# Patient Record
Sex: FEMALE | Race: WHITE | NOT HISPANIC OR LATINO | Employment: UNEMPLOYED | URBAN - NONMETROPOLITAN AREA
[De-identification: names, ages, dates, MRNs, and addresses within clinical notes are randomized per-mention and may not be internally consistent; named-entity substitution may affect disease eponyms.]

---

## 2017-01-09 ENCOUNTER — CLINICAL SUPPORT (OUTPATIENT)
Dept: OTOLARYNGOLOGY | Facility: CLINIC | Age: 52
End: 2017-01-09

## 2017-01-09 DIAGNOSIS — J30.9 ALLERGIC RHINITIS, UNSPECIFIED ALLERGIC RHINITIS TRIGGER, UNSPECIFIED RHINITIS SEASONALITY: Primary | ICD-10-CM

## 2017-01-09 PROCEDURE — 95117 IMMUNOTHERAPY INJECTIONS: CPT | Performed by: OTOLARYNGOLOGY

## 2017-01-09 NOTE — PROGRESS NOTES
ALLERGY SHOT PROCEDURE NOTE     ALLERGY TECHNICIAN:   Amy Connors RN     ALLERGY HISTORY OF PRESENT ILLNESS:   The patient is a 51 y.o.  who returns for immunotherapy injection.   The patient overall has had an improvement of symptoms since the last injection. The patient has had a 90% improvement of nasal drainage, nasal congestion and sinus pressure . That has lasted 6 days. He has had a return of nasal drainage, sneezing and itchy nose since the last injection.     INJECTION DETAILS:   The site of the injection was cleansed with an alcohol swab. Serum was injected into the site after pulling back on the plunger to prevent intravascular injection. The patient showed no signs of immediate reaction. After the injection and was instructed to wait in the allergy waiting area for 20 minutes. After waiting, the patient showed no signs of reaction and was allowed to leave..

## 2017-01-09 NOTE — MR AVS SNAPSHOT
Floridalma Stewart   1/9/2017 9:15 AM   Clinical Support    Dept Phone:  [phone]   Encounter #:  [id number]    Provider:  ALLERGY NURSE [facility]   Department:  [facility]                Your Full Care Plan              Your Updated Medication List          This list is accurate as of: 1/9/17 10:12 AM.  Always use your most recent med list.                amitriptyline 100 MG tablet   Commonly known as:  ELAVIL       aspirin 81 MG tablet       atorvastatin 40 MG tablet   Commonly known as:  LIPITOR       baclofen 20 MG tablet   Commonly known as:  LIORESAL       busPIRone 15 MG tablet   Commonly known as:  BUSPAR       cetirizine 10 MG tablet   Commonly known as:  zyrTEC       FLUoxetine 40 MG capsule   Commonly known as:  PROzac       KLOR-CON 10 MEQ CR tablet   Generic drug:  potassium chloride       lisinopril 10 MG tablet   Commonly known as:  PRINIVIL,ZESTRIL       meclizine 25 MG tablet   Commonly known as:  ANTIVERT       montelukast 10 MG tablet   Commonly known as:  SINGULAIR       pseudoephedrine 60 MG tablet   Commonly known as:  SUDAFED       rizatriptan MLT 10 MG disintegrating tablet   Commonly known as:  MAXALT-MLT       zolpidem 10 MG tablet   Commonly known as:  AMBIEN               You Were Diagnosed With        Codes Comments    Allergic rhinitis, unspecified allergic rhinitis trigger, unspecified rhinitis seasonality    -  Primary ICD-10-CM: J30.9  ICD-9-CM: 477.9       Medications to be Given to You by a Medical Professional     Due       Frequency    (none) Allergy Serum Injection  Once    (none) Allergy Serum Injection  Once      Instructions     None    Patient Instructions History      Upcoming Appointments     Visit Type Date Time Department    INJECTION 1/9/2017  9:15 AM [facility]    FOLLOW UP 2/13/2017  9:00 AM [facility] Signup     Our records indicate that you have declined [facility]  "signup. If you would like to sign up for [facility], please email [email or web address] or call [phone] to obtain an activation code.             Other Info from Your Visit           Your Appointments     Feb 13, 2017  9:00 AM CST   Follow Up with Benedict Gupta MD   [facility] (--)    [address] [facility] [id number] [phone]           Arrive 15 minutes prior to appointment.              Allergies     Ciprofloxacin  Swelling    Hydromorphone      agitated    Ibuprofen  Nausea Only    GI Upset    Naproxen  Nausea Only    Nsaids  Nausea And Vomiting    Penicillins  Nausea Only    Pt states \"I black out\"    Tramadol  Nausea And Vomiting      Reason for Visit     Immunotherapy vial safety       Vital Signs     Smoking Status                   Never Assessed           Problems and Diagnoses Noted     Nasal inflammation due to allergen      Medications Administered     Allergy Serum Injection                  Allergy Serum Injection                      "

## 2017-02-13 ENCOUNTER — OFFICE VISIT (OUTPATIENT)
Dept: OTOLARYNGOLOGY | Facility: CLINIC | Age: 52
End: 2017-02-13

## 2017-02-13 VITALS
BODY MASS INDEX: 35 KG/M2 | HEIGHT: 67 IN | SYSTOLIC BLOOD PRESSURE: 127 MMHG | HEART RATE: 103 BPM | DIASTOLIC BLOOD PRESSURE: 86 MMHG | TEMPERATURE: 98.4 F | WEIGHT: 223 LBS

## 2017-02-13 DIAGNOSIS — J30.9 ALLERGIC RHINITIS, UNSPECIFIED ALLERGIC RHINITIS TRIGGER, UNSPECIFIED RHINITIS SEASONALITY: Primary | ICD-10-CM

## 2017-02-13 DIAGNOSIS — H69.83 EUSTACHIAN TUBE DYSFUNCTION, BILATERAL: ICD-10-CM

## 2017-02-13 PROBLEM — H69.80 EUSTACHIAN TUBE DYSFUNCTION: Status: ACTIVE | Noted: 2017-02-13

## 2017-02-13 PROCEDURE — 99214 OFFICE O/P EST MOD 30 MIN: CPT | Performed by: OTOLARYNGOLOGY

## 2017-02-13 PROCEDURE — 69433 CREATE EARDRUM OPENING: CPT | Performed by: OTOLARYNGOLOGY

## 2017-02-13 RX ORDER — FLUTICASONE PROPIONATE 50 MCG
2 SPRAY, SUSPENSION (ML) NASAL DAILY
Qty: 1 EACH | Refills: 11 | Status: SHIPPED | OUTPATIENT
Start: 2017-02-13 | End: 2017-09-11 | Stop reason: SDUPTHER

## 2017-02-13 RX ORDER — MONTELUKAST SODIUM 10 MG/1
10 TABLET ORAL NIGHTLY
Qty: 30 TABLET | Refills: 11 | Status: SHIPPED | OUTPATIENT
Start: 2017-02-13

## 2017-02-13 RX ORDER — FLUTICASONE PROPIONATE 50 MCG
2 SPRAY, SUSPENSION (ML) NASAL DAILY
COMMUNITY
End: 2017-02-13 | Stop reason: SDUPTHER

## 2017-02-13 NOTE — PATIENT INSTRUCTIONS
Steps to Quit Smoking   Smoking tobacco can be harmful to your health and can affect almost every organ in your body. Smoking puts you, and those around you, at risk for developing many serious chronic diseases. Quitting smoking is difficult, but it is one of the best things that you can do for your health. It is never too late to quit.  WHAT ARE THE BENEFITS OF QUITTING SMOKING?  When you quit smoking, you lower your risk of developing serious diseases and conditions, such as:  · Lung cancer or lung disease, such as COPD.  · Heart disease.  · Stroke.  · Heart attack.  · Infertility.  · Osteoporosis and bone fractures.  Additionally, symptoms such as coughing, wheezing, and shortness of breath may get better when you quit. You may also find that you get sick less often because your body is stronger at fighting off colds and infections. If you are pregnant, quitting smoking can help to reduce your chances of having a baby of low birth weight.  HOW DO I GET READY TO QUIT?  When you decide to quit smoking, create a plan to make sure that you are successful. Before you quit:  · Pick a date to quit. Set a date within the next two weeks to give you time to prepare.  · Write down the reasons why you are quitting. Keep this list in places where you will see it often, such as on your bathroom mirror or in your car or wallet.  · Identify the people, places, things, and activities that make you want to smoke (triggers) and avoid them. Make sure to take these actions:    Throw away all cigarettes at home, at work, and in your car.    Throw away smoking accessories, such as ashtrays and lighters.    Clean your car and make sure to empty the ashtray.    Clean your home, including curtains and carpets.  · Tell your family, friends, and coworkers that you are quitting. Support from your loved ones can make quitting easier.  · Talk with your health care provider about your options for quitting smoking.  · Find out what treatment  "options are covered by your health insurance.  WHAT STRATEGIES CAN I USE TO QUIT SMOKING?   Talk with your healthcare provider about different strategies to quit smoking. Some strategies include:  · Quitting smoking altogether instead of gradually lessening how much you smoke over a period of time. Research shows that quitting \"cold turkey\" is more successful than gradually quitting.  · Attending in-person counseling to help you build problem-solving skills. You are more likely to have success in quitting if you attend several counseling sessions. Even short sessions of 10 minutes can be effective.  · Finding resources and support systems that can help you to quit smoking and remain smoke-free after you quit. These resources are most helpful when you use them often. They can include:    Online chats with a counselor.    Telephone quitlines.    Printed self-help materials.    Support groups or group counseling.    Text messaging programs.    Mobile phone applications.  · Taking medicines to help you quit smoking. (If you are pregnant or breastfeeding, talk with your health care provider first.) Some medicines contain nicotine and some do not. Both types of medicines help with cravings, but the medicines that include nicotine help to relieve withdrawal symptoms. Your health care provider may recommend:    Nicotine patches, gum, or lozenges.    Nicotine inhalers or sprays.    Non-nicotine medicine that is taken by mouth.  Talk with your health care provider about combining strategies, such as taking medicines while you are also receiving in-person counseling. Using these two strategies together makes you more likely to succeed in quitting than if you used either strategy on its own.  If you are pregnant or breastfeeding, talk with your health care provider about finding counseling or other support strategies to quit smoking. Do not take medicine to help you quit smoking unless told to do so by your health care " provider.  WHAT THINGS CAN I DO TO MAKE IT EASIER TO QUIT?  Quitting smoking might feel overwhelming at first, but there is a lot that you can do to make it easier. Take these important actions:  · Reach out to your family and friends and ask that they support and encourage you during this time. Call telephone quitlines, reach out to support groups, or work with a counselor for support.  · Ask people who smoke to avoid smoking around you.  · Avoid places that trigger you to smoke, such as bars, parties, or smoke-break areas at work.  · Spend time around people who do not smoke.  · Lessen stress in your life, because stress can be a smoking trigger for some people. To lessen stress, try:    Exercising regularly.    Deep-breathing exercises.    Yoga.    Meditating.    Performing a body scan. This involves closing your eyes, scanning your body from head to toe, and noticing which parts of your body are particularly tense. Purposefully relax the muscles in those areas.  · Download or purchase mobile phone or tablet apps (applications) that can help you stick to your quit plan by providing reminders, tips, and encouragement. There are many free apps, such as QuitGuide from the CDC (Centers for Disease Control and Prevention). You can find other support for quitting smoking (smoking cessation) through smokefree.gov and other websites.  HOW WILL I FEEL WHEN I QUIT SMOKING?  Within the first 24 hours of quitting smoking, you may start to feel some withdrawal symptoms. These symptoms are usually most noticeable 2-3 days after quitting, but they usually do not last beyond 2-3 weeks. Changes or symptoms that you might experience include:  · Mood swings.  · Restlessness, anxiety, or irritation.  · Difficulty concentrating.  · Dizziness.  · Strong cravings for sugary foods in addition to nicotine.  · Mild weight gain.  · Constipation.  · Nausea.  · Coughing or a sore throat.  · Changes in how your medicines work in your  body.  · A depressed mood.  · Difficulty sleeping (insomnia).  After the first 2-3 weeks of quitting, you may start to notice more positive results, such as:  · Improved sense of smell and taste.  · Decreased coughing and sore throat.  · Slower heart rate.  · Lower blood pressure.  · Clearer skin.  · The ability to breathe more easily.  · Fewer sick days.  Quitting smoking is very challenging for most people. Do not get discouraged if you are not successful the first time. Some people need to make many attempts to quit before they achieve long-term success. Do your best to stick to your quit plan, and talk with your health care provider if you have any questions or concerns.     This information is not intended to replace advice given to you by your health care provider. Make sure you discuss any questions you have with your health care provider.     Document Released: 12/12/2002 Document Revised: 05/03/2016 Document Reviewed: 05/03/2016  [employer] Patient Education ©2016 [employer]

## 2017-02-13 NOTE — PROGRESS NOTES
PRIMARY CARE PROVIDER: BLANCHE Timmons  REFERRING PROVIDER: No ref. provider found    Chief Complaint   Patient presents with   • Follow-up     6 month allergy follow up       Subjective   History of Present Illness:  Floridalma Stewart is a  52 y.o.  female who presents for follow up.  She still has mild symptoms but is improved overall since the last evaluation. She hs had left ear drainage recently that is mild in nature.     Review of Systems:  Review of Systems   Constitutional: Negative for chills and fever.   HENT:        See HPI   Eyes: Negative for redness and itching.   Respiratory: Negative for cough, choking and shortness of breath.    Gastrointestinal: Negative for nausea and vomiting.   Allergic/Immunologic: Positive for environmental allergies. Negative for food allergies.   Neurological: Positive for headaches. Negative for dizziness.   Hematological: Negative.    Psychiatric/Behavioral: Negative.        Past History:  Past Medical History   Diagnosis Date   • Allergic rhinitis    • Arthritis    • Chronic rhinitis    • Chronic sinusitis    • Eustachian tube dysfunction    • GERD (gastroesophageal reflux disease)    • Tobacco use disorder    • Vertigo      Past Surgical History   Procedure Laterality Date   • Appendectomy     • Breast biopsy     •  section     • Cholecystectomy     • Myringotomy w/ tubes Bilateral 2015   • Hysterectomy     • Tympanoplasty Left      Family History   Problem Relation Age of Onset   • Diabetes Mother    • Cancer Father      Social History   Substance Use Topics   • Smoking status: Current Every Day Smoker   • Smokeless tobacco: None   • Alcohol use None       Current Outpatient Prescriptions:   •  amitriptyline (ELAVIL) 100 MG tablet, Take 100 mg by mouth., Disp: , Rfl:   •  aspirin 81 MG tablet, Take 81 mg by mouth daily., Disp: , Rfl:   •  atorvastatin (LIPITOR) 40 MG tablet, Take 40 mg by mouth., Disp: , Rfl:   •  baclofen (LIORESAL) 20 MG tablet, Take  20 mg by mouth., Disp: , Rfl:   •  busPIRone (BUSPAR) 15 MG tablet, Take 15 mg by mouth., Disp: , Rfl:   •  cetirizine (ZyrTEC) 10 MG tablet, Take 10 mg by mouth., Disp: , Rfl:   •  FLUoxetine (PROzac) 40 MG capsule, Take 40 mg by mouth., Disp: , Rfl:   •  fluticasone (FLONASE) 50 MCG/ACT nasal spray, 2 sprays into each nostril Daily., Disp: 1 each, Rfl: 11  •  lisinopril (PRINIVIL,ZESTRIL) 10 MG tablet, Take 10 mg by mouth daily., Disp: , Rfl:   •  meclizine (ANTIVERT) 25 MG tablet, Take 25 mg by mouth 3 (three) times a day., Disp: , Rfl:   •  montelukast (SINGULAIR) 10 MG tablet, Take 1 tablet by mouth Every Night., Disp: 30 tablet, Rfl: 11  •  potassium chloride (KLOR-CON) 10 MEQ CR tablet, Take 10 mEq by mouth., Disp: , Rfl:   •  pseudoephedrine (SUDAFED) 60 MG tablet, Take 60 mg by mouth., Disp: , Rfl:   •  rizatriptan MLT (MAXALT-MLT) 10 MG disintegrating tablet, Take 10 mg by mouth., Disp: , Rfl:   •  zolpidem (AMBIEN) 10 MG tablet, Take 10 mg by mouth., Disp: , Rfl:   Allergies:  Ciprofloxacin; Hydromorphone; Ibuprofen; Naproxen; Nsaids; Penicillins; and Tramadol    Objective     Vital Signs:       Physical Exam:  CONSTITUTIONAL: well nourished, well-developed, alert, oriented, in no acute distress   COMMUNICATION AND VOICE: able to communicate normally for age, normal voice quality  HEAD: normocephalic, no lesions, atraumatic, no tenderness, no masses   FACE: appearance normal, no lesions, no tenderness, no deformities, facial motion symmetric  EYES: ocular motility normal, eyelids normal, orbits normal, no proptosis, conjunctiva normal , pupils equal, round   EARS:  Hearing: response to conversational voice normal bilaterally   External Ears: auricles without lesions  Otoscopic Exam:   EXTERNAL CANAL: bilateral extruded tube present,   RIGHT TYMPANIC MEMBRANE: mild  myringosclerosis present,  LEFT TYMPANIC MEMBRANE: mild myringosclerosis present  NOSE:  External Nose: structure normal, no tenderness on  palpation, no nasal discharge, no lesions, no evidence of trauma, nostrils patent   Intranasal exam: nasal mucosa with mucosal congestion and erythema, nasal septum relatively midline   ORAL:  Lips: upper and lower lips without lesion   NECK: neck appearance normal  CHEST/RESPIRATORY: respiratory effort normal, normal chest excursion  CARDIOVASCULAR: extremities without cyanosis or edema   NEUROLOGIC/PSYCHIATRIC: oriented appropriately, mood normal, affect appropriate, CN II-XII intact grossly      Results Review:   none    Assessment   1. Allergic rhinitis, unspecified allergic rhinitis trigger, unspecified rhinitis seasonality    2. Eustachian tube dysfunction, bilateral        Plan   Continue current management plan.  -------MEDICATIONS:-------  continue previous medication as prescribed  New Medications Ordered This Visit   Medications   • fluticasone (FLONASE) 50 MCG/ACT nasal spray     Si sprays into each nostril Daily.     Dispense:  1 each     Refill:  11   • montelukast (SINGULAIR) 10 MG tablet     Sig: Take 1 tablet by mouth Every Night.     Dispense:  30 tablet     Refill:  11     ---INFORMED CONSENT DISCUSSION:---  MYRINGOTOMY TUBE INSERTION: The risks and benefits of myringotomy tube insertion were explained including but not limited to pain, aural fullness, bleeding, infection, risks of the anesthesia, persistent tympanic membrane perforation, chronic otorrhea, early and late extrusion, and the possibility for the need of reinsertion after extrusion. Alternatives were discussed. The patient/parents demonstrated understanding of these risks. Questions were asked appropriately answered.    -----INSTRUCTIONS-----  Protect getting water in the ears. If needed, may use over the counter silicone plugs or a cotton ball coated with vasoline when bathing. If conservative measures are not working, consider obtaining molded earplugs from the audiology department to use while bathing or swimming.    Return in  about 2 months (around 4/13/2017).     MYRINGOTOMY TUBE INSERTION     DATE OF OPERATION:  2/15/2017    PROCEDURE PERFORMED:  bilateral myringotomy and tube insertion.    SURGEON:  Benedict Gupta MD    ANESTHESIA: topical phenol    INDICATIONS:  lForidalma Stewart is a 52 y.o. female with the above diagnosis.  After failure of conservative medical management, myringotomy tube insertion was felt to be indicated.  The risks and benefits of myringotomy tube insertion were explained including but not limited to pain, aural fullness, bleeding, infection, risks of the anesthesia, persistent tympanic membrane perforation, chronic otorrhea, early and late extrusion, and the possibility for the need of reinsertion after extrusion. Alternatives were discussed. An understanding of these risks was demonstrated. Questions were asked appropriately answered.      ESTIMATED BLOOD LOSS:  Negligible.    DRAINS: None.    SPECIMEN:  None.    COMPLICATIONS:  None.    FINDINGS: dry middle ears    PROCEDURE DESCRIPTION:  The patient was taken back to the treatment room and placed supine on the procedure table. A time out was performed. After this was done the operating microscope was wheeled into view. Using the speculum and curette, the external auditory canal was cleaned of its cerumen and this exposed the tympanic membrane. A myringotomy was created in a radial fashion. After suctioning, a Day modified beveled was placed in the myringotomy. The same procedure was then carried out on the opposite side in the same manner. No drops were placed. There were no complications during the case.    Benedict Gupta MD  02/15/17  10:01 AM

## 2017-02-24 ENCOUNTER — CLINICAL SUPPORT NO REQUIREMENTS (OUTPATIENT)
Dept: OTOLARYNGOLOGY | Facility: CLINIC | Age: 52
End: 2017-02-24

## 2017-02-24 DIAGNOSIS — J30.89 OTHER ALLERGIC RHINITIS: ICD-10-CM

## 2017-02-24 DIAGNOSIS — J30.2 OTHER SEASONAL ALLERGIC RHINITIS: Primary | ICD-10-CM

## 2017-02-24 PROCEDURE — 95165 ANTIGEN THERAPY SERVICES: CPT | Performed by: OTOLARYNGOLOGY

## 2017-02-27 ENCOUNTER — CLINICAL SUPPORT (OUTPATIENT)
Dept: OTOLARYNGOLOGY | Facility: CLINIC | Age: 52
End: 2017-02-27

## 2017-02-27 DIAGNOSIS — J30.9 ALLERGIC RHINITIS, UNSPECIFIED ALLERGIC RHINITIS TRIGGER, UNSPECIFIED RHINITIS SEASONALITY: Primary | ICD-10-CM

## 2017-02-27 PROCEDURE — 95117 IMMUNOTHERAPY INJECTIONS: CPT | Performed by: OTOLARYNGOLOGY

## 2017-02-27 NOTE — PROGRESS NOTES
ALLERGY SHOT PROCEDURE NOTE     ALLERGY TECHNICIAN:   Amy Connors RN     ALLERGY HISTORY OF PRESENT ILLNESS:   The patient is a 52 y.o., who returns for immunotherapy injection.   The patient overall has had an improvement of symptoms since the last injection. The patient has had a 90% improvement of nasal drainage, nasal congestion and headaches . That has lasted 7 days. She has had a return of nasal drainage, sneezing and itchy nose since the last injection.     INJECTION DETAILS:   The site of the injection was cleansed with an alcohol swab. Serum was injected into the site after pulling back on the plunger to prevent intravascular injection. The patient showed no signs of immediate reaction. After the injection and was instructed to wait in the allergy waiting area for 20 minutes. After waiting, the patient showed no signs of reaction and was allowed to leave.    Reaction: 3mm

## 2017-03-27 ENCOUNTER — CLINICAL SUPPORT (OUTPATIENT)
Dept: OTOLARYNGOLOGY | Facility: CLINIC | Age: 52
End: 2017-03-27

## 2017-03-27 DIAGNOSIS — J30.9 ALLERGIC RHINITIS, UNSPECIFIED ALLERGIC RHINITIS TRIGGER, UNSPECIFIED RHINITIS SEASONALITY: Primary | ICD-10-CM

## 2017-03-27 PROCEDURE — 95117 IMMUNOTHERAPY INJECTIONS: CPT | Performed by: OTOLARYNGOLOGY

## 2017-03-27 NOTE — PROGRESS NOTES
ALLERGY SHOT PROCEDURE NOTE     ALLERGY TECHNICIAN:   Amy Connors RN    ALLERGY HISTORY OF PRESENT ILLNESS:   The patient is a 52 y.o., who returns for immunotherapy injection.   The patient overall has had an improvement of symptoms since the last injection. The patient has had a 80% improvement of nasal drainage, sneezing and itchy nose . That has lasted 6 days. She has had a return of nasal drainage, sneezing and itchy nose since the last injection.     INJECTION DETAILS:   The site of the injection was cleansed with an alcohol swab. Serum was injected into the site after pulling back on the plunger to prevent intravascular injection. The patient showed no signs of immediate reaction. After the injection and was instructed to wait in the allergy waiting area for 20 minutes. After waiting, the patient showed no signs of reaction and was allowed to leave.    Reaction: 3mm  Side :     left arm

## 2017-04-10 ENCOUNTER — CLINICAL SUPPORT (OUTPATIENT)
Dept: OTOLARYNGOLOGY | Facility: CLINIC | Age: 52
End: 2017-04-10

## 2017-04-10 DIAGNOSIS — J30.9 ALLERGIC RHINITIS, UNSPECIFIED ALLERGIC RHINITIS TRIGGER, UNSPECIFIED RHINITIS SEASONALITY: Primary | ICD-10-CM

## 2017-04-10 PROCEDURE — 95115 IMMUNOTHERAPY ONE INJECTION: CPT | Performed by: OTOLARYNGOLOGY

## 2017-04-10 NOTE — PROGRESS NOTES
ALLERGY SHOT PROCEDURE NOTE     ALLERGY TECHNICIAN:   Amy Connors RN    ALLERGY HISTORY OF PRESENT ILLNESS:   The patient is a 52 y.o., who returns for immunotherapy injection.   The patient overall has had an improvement of symptoms since the last injection. The patient has had a 90% improvement of nasal drainage, nasal congestion and itchy nose . That has lasted 7 days. She has had a return of itchy nose since the last injection.     INJECTION DETAILS:   The site of the injection was cleansed with an alcohol swab. Serum was injected into the site after pulling back on the plunger to prevent intravascular injection. The patient showed no signs of immediate reaction. After the injection and was instructed to wait in the allergy waiting area for 20 minutes. After waiting, the patient showed no signs of reaction and was allowed to leave.    Reaction: 3mm  Side :     right arm

## 2017-04-24 ENCOUNTER — OFFICE VISIT (OUTPATIENT)
Dept: OTOLARYNGOLOGY | Facility: CLINIC | Age: 52
End: 2017-04-24

## 2017-04-24 ENCOUNTER — CLINICAL SUPPORT (OUTPATIENT)
Dept: OTOLARYNGOLOGY | Facility: CLINIC | Age: 52
End: 2017-04-24

## 2017-04-24 VITALS
SYSTOLIC BLOOD PRESSURE: 109 MMHG | HEART RATE: 109 BPM | DIASTOLIC BLOOD PRESSURE: 80 MMHG | TEMPERATURE: 98.6 F | HEIGHT: 67 IN

## 2017-04-24 DIAGNOSIS — J30.9 ALLERGIC RHINITIS, UNSPECIFIED ALLERGIC RHINITIS TRIGGER, UNSPECIFIED RHINITIS SEASONALITY: Primary | ICD-10-CM

## 2017-04-24 DIAGNOSIS — H69.83 EUSTACHIAN TUBE DYSFUNCTION, BILATERAL: ICD-10-CM

## 2017-04-24 PROCEDURE — 99213 OFFICE O/P EST LOW 20 MIN: CPT | Performed by: OTOLARYNGOLOGY

## 2017-04-24 PROCEDURE — 95115 IMMUNOTHERAPY ONE INJECTION: CPT | Performed by: OTOLARYNGOLOGY

## 2017-04-24 NOTE — PROGRESS NOTES
Patient Care Team:  BLANCHE Timmons as PCP - General (Family Medicine)  Benedict Gupta MD as Consulting Physician (Otolaryngology)    Chief Complaint   Patient presents with   • Follow-up     2 month allergy follow up       Subjective   History of Present Illness:  Floridalma Stewart is a  52 y.o.  female who presents for follow up with no acute complaints. She is status post tube placement and is n every other week immunotherapy.    Review of Systems:  Review of Systems   Constitutional: Negative.    HENT:        See HPI   Eyes: Negative.    Respiratory: Negative.    Cardiovascular: Negative.    Gastrointestinal: Negative.    Allergic/Immunologic: Positive for environmental allergies. Negative for food allergies.   Neurological: Negative.    Hematological: Negative.    Psychiatric/Behavioral: Negative.        Past History:  Past Medical History:   Diagnosis Date   • Allergic rhinitis    • Arthritis    • Chronic rhinitis    • Chronic sinusitis    • Eustachian tube dysfunction    • GERD (gastroesophageal reflux disease)    • Tobacco use disorder    • Vertigo      Past Surgical History:   Procedure Laterality Date   • APPENDECTOMY     • BREAST BIOPSY     •  SECTION     • CHOLECYSTECTOMY     • HYSTERECTOMY     • MYRINGOTOMY W/ TUBES Bilateral 2015   • TYMPANOPLASTY Left      Family History   Problem Relation Age of Onset   • Diabetes Mother    • Cancer Father      Social History   Substance Use Topics   • Smoking status: Current Every Day Smoker   • Smokeless tobacco: None   • Alcohol use None       Current Outpatient Prescriptions:   •  amitriptyline (ELAVIL) 100 MG tablet, Take 100 mg by mouth., Disp: , Rfl:   •  aspirin 81 MG tablet, Take 81 mg by mouth daily., Disp: , Rfl:   •  atorvastatin (LIPITOR) 40 MG tablet, Take 40 mg by mouth., Disp: , Rfl:   •  baclofen (LIORESAL) 20 MG tablet, Take 20 mg by mouth., Disp: , Rfl:   •  busPIRone (BUSPAR) 15 MG tablet, Take 15 mg by mouth., Disp: , Rfl:    •  cetirizine (ZyrTEC) 10 MG tablet, Take 10 mg by mouth., Disp: , Rfl:   •  FLUoxetine (PROzac) 40 MG capsule, Take 40 mg by mouth., Disp: , Rfl:   •  fluticasone (FLONASE) 50 MCG/ACT nasal spray, 2 sprays into each nostril Daily., Disp: 1 each, Rfl: 11  •  lisinopril (PRINIVIL,ZESTRIL) 10 MG tablet, Take 10 mg by mouth daily., Disp: , Rfl:   •  meclizine (ANTIVERT) 25 MG tablet, Take 25 mg by mouth 3 (three) times a day., Disp: , Rfl:   •  montelukast (SINGULAIR) 10 MG tablet, Take 1 tablet by mouth Every Night., Disp: 30 tablet, Rfl: 11  •  potassium chloride (KLOR-CON) 10 MEQ CR tablet, Take 10 mEq by mouth., Disp: , Rfl:   •  pseudoephedrine (SUDAFED) 60 MG tablet, Take 60 mg by mouth., Disp: , Rfl:   •  rizatriptan MLT (MAXALT-MLT) 10 MG disintegrating tablet, Take 10 mg by mouth., Disp: , Rfl:   •  zolpidem (AMBIEN) 10 MG tablet, Take 10 mg by mouth., Disp: , Rfl:   No current facility-administered medications for this visit.   Allergies:  Ciprofloxacin; Hydromorphone; Ibuprofen; Naproxen; Nsaids; Penicillins; and Tramadol    Objective     Vital Signs:  Temp:  [98.6 °F (37 °C)] 98.6 °F (37 °C)  Heart Rate:  [109] 109  BP: (109)/(80) 109/80    Physical Exam:  CONSTITUTIONAL: well nourished, well-developed, alert, oriented, in no acute distress   COMMUNICATION AND VOICE: able to communicate normally, normal voice quality  HEAD: normocephalic, no lesions, atraumatic, no tenderness, no masses   FACE: appearance normal, no lesions, no tenderness, no deformities, facial motion symmetric  EYES: ocular motility normal, eyelids normal, orbits normal, no proptosis, conjunctiva normal , pupils equal, round   EARS:  Hearing: hearing to conversational voice intact bilaterally   External Ears: normal bilaterally, no lesions  Otoscopic: tympanic membrane appearance normal, no lesions, tubes in place and dry, normal mobility, no fluid  NOSE:  External Nose: external nasal structure normal, no tenderness on palpation, no  nasal discharge, no lesions, no evidence of trauma, nostrils patent   ORAL:  Lips: upper and lower lips without lesion   NECK:  Inspection and Palpation: neck appearance normal, no masses or tenderness  CHEST/RESPIRATORY: normal respiratory effort   CARDIOVASCULAR: no cyanosis or edema   NEUROLOGICAL/PSYCHIATRIC: oriented to time, place and person, mood normal, affect appropriate, CN II-XII intact grossly      Assessment   1. Allergic rhinitis, unspecified allergic rhinitis trigger, unspecified rhinitis seasonality    2. Eustachian tube dysfunction, bilateral        Plan   Continue current management plan.  Dry ear precautions     Return in about 6 months (around 10/24/2017).    Benedict Gupta MD  04/24/17  1:53 PM

## 2017-04-24 NOTE — PROGRESS NOTES
ALLERGY SHOT PROCEDURE NOTE     ALLERGY TECHNICIAN:   Amy Connors RN    ALLERGY HISTORY OF PRESENT ILLNESS:   The patient is a 52 y.o., who returns for immunotherapy injection.   The patient overall has had an improvement of symptoms since the last injection. The patient has had a 90% improvement of postnasal drip, sneezing and itchy nose . That has lasted 7 days. She has had a return of postnasal drip, sneezing and itchy nose since the last injection.     INJECTION DETAILS:   The site of the injection was cleansed with an alcohol swab. Serum was injected into the site after pulling back on the plunger to prevent intravascular injection. The patient showed no signs of immediate reaction. After the injection and was instructed to wait in the allergy waiting area for 20 minutes. After waiting, the patient showed no signs of reaction and was allowed to leave.    Reaction: 4mm  Side :     left arm

## 2017-05-08 ENCOUNTER — CLINICAL SUPPORT (OUTPATIENT)
Dept: OTOLARYNGOLOGY | Facility: CLINIC | Age: 52
End: 2017-05-08

## 2017-05-08 DIAGNOSIS — J30.9 ALLERGIC RHINITIS, UNSPECIFIED ALLERGIC RHINITIS TRIGGER, UNSPECIFIED RHINITIS SEASONALITY: Primary | ICD-10-CM

## 2017-05-08 PROCEDURE — 95115 IMMUNOTHERAPY ONE INJECTION: CPT | Performed by: OTOLARYNGOLOGY

## 2017-05-22 ENCOUNTER — CLINICAL SUPPORT (OUTPATIENT)
Dept: OTOLARYNGOLOGY | Facility: CLINIC | Age: 52
End: 2017-05-22

## 2017-05-22 DIAGNOSIS — J30.9 ALLERGIC RHINITIS, UNSPECIFIED ALLERGIC RHINITIS TRIGGER, UNSPECIFIED RHINITIS SEASONALITY: Primary | ICD-10-CM

## 2017-05-22 PROCEDURE — 95115 IMMUNOTHERAPY ONE INJECTION: CPT | Performed by: OTOLARYNGOLOGY

## 2017-06-12 ENCOUNTER — CLINICAL SUPPORT (OUTPATIENT)
Dept: OTOLARYNGOLOGY | Facility: CLINIC | Age: 52
End: 2017-06-12

## 2017-06-12 DIAGNOSIS — J30.89 OTHER ALLERGIC RHINITIS: Primary | ICD-10-CM

## 2017-06-12 PROCEDURE — 95115 IMMUNOTHERAPY ONE INJECTION: CPT | Performed by: OTOLARYNGOLOGY

## 2017-06-13 NOTE — PROGRESS NOTES
ALLERGY SHOT PROCEDURE NOTE     ALLERGY TECHNICIAN:   Sri Rudolph MA    ALLERGY HISTORY OF PRESENT ILLNESS:   The patient is a 52 y.o., who returns for immunotherapy injection.   The patient overall has had an improvement of symptoms since the last injection. The patient has had a 90% improvement of sneezing and itchy nose . That has lasted 7 days. She has had a return of sneezing and itchy nose since the last injection.     INJECTION DETAILS:   The site of the injection was cleansed with an alcohol swab. Serum was injected into the site after pulling back on the plunger to prevent intravascular injection. The patient showed no signs of immediate reaction. After the injection and was instructed to wait in the allergy waiting area for 20 minutes. After waiting, the patient showed no signs of reaction and was allowed to leave.    Right Arm @ .50cc

## 2017-06-26 ENCOUNTER — CLINICAL SUPPORT (OUTPATIENT)
Dept: OTOLARYNGOLOGY | Facility: CLINIC | Age: 52
End: 2017-06-26

## 2017-06-26 DIAGNOSIS — J30.9 ALLERGIC RHINITIS, UNSPECIFIED ALLERGIC RHINITIS TRIGGER, UNSPECIFIED RHINITIS SEASONALITY: Primary | ICD-10-CM

## 2017-06-26 PROCEDURE — 95115 IMMUNOTHERAPY ONE INJECTION: CPT | Performed by: OTOLARYNGOLOGY

## 2017-06-26 NOTE — PROGRESS NOTES
ALLERGY SHOT PROCEDURE NOTE     ALLERGY TECHNICIAN:   Amy Connors RN    ALLERGY HISTORY OF PRESENT ILLNESS:   The patient is a 52 y.o., who returns for immunotherapy injection.   The patient overall has had an improvement of symptoms since the last injection. The patient has had a 90% improvement of nasal drainage, postnasal drip, sneezing and itchy nose . That has lasted 6 days. She has had a return of postnasal drip, sneezing and itchy nose since the last injection.     INJECTION DETAILS:   The site of the injection was cleansed with an alcohol swab. Serum was injected into the site after pulling back on the plunger to prevent intravascular injection. The patient showed no signs of immediate reaction. After the injection and was instructed to wait in the allergy waiting area for 20 minutes. After waiting, the patient showed no signs of reaction and was allowed to leave.    Reaction: 4mm  Side :     left arm

## 2017-06-30 ENCOUNTER — CLINICAL SUPPORT NO REQUIREMENTS (OUTPATIENT)
Dept: OTOLARYNGOLOGY | Facility: CLINIC | Age: 52
End: 2017-06-30

## 2017-06-30 DIAGNOSIS — J30.89 OTHER ALLERGIC RHINITIS: Primary | ICD-10-CM

## 2017-06-30 PROCEDURE — 95165 ANTIGEN THERAPY SERVICES: CPT | Performed by: OTOLARYNGOLOGY

## 2017-07-10 ENCOUNTER — CLINICAL SUPPORT (OUTPATIENT)
Dept: OTOLARYNGOLOGY | Facility: CLINIC | Age: 52
End: 2017-07-10

## 2017-07-10 DIAGNOSIS — J30.9 ALLERGIC RHINITIS, UNSPECIFIED ALLERGIC RHINITIS TRIGGER, UNSPECIFIED RHINITIS SEASONALITY: Primary | ICD-10-CM

## 2017-07-10 PROCEDURE — 95115 IMMUNOTHERAPY ONE INJECTION: CPT | Performed by: OTOLARYNGOLOGY

## 2017-07-10 NOTE — PROGRESS NOTES
ALLERGY SHOT PROCEDURE NOTE     ALLERGY TECHNICIAN:   Amy Connors RN    ALLERGY HISTORY OF PRESENT ILLNESS:   The patient is a 52 y.o., who returns for immunotherapy injection.   The patient overall has had an improvement of symptoms since the last injection. The patient has had a 90% improvement of nasal drainage, nasal congestion, sneezing and itchy nose . That has lasted 6 days. She has had a return of sneezing and itchy nose since the last injection.     INJECTION DETAILS:   The site of the injection was cleansed with an alcohol swab. Serum was injected into the site after pulling back on the plunger to prevent intravascular injection. The patient showed no signs of immediate reaction. After the injection and was instructed to wait in the allergy waiting area for 20 minutes. After waiting, the patient showed no signs of reaction and was allowed to leave.    Reaction: 3mm  Side :     right arm

## 2017-08-14 ENCOUNTER — CLINICAL SUPPORT (OUTPATIENT)
Dept: OTOLARYNGOLOGY | Facility: CLINIC | Age: 52
End: 2017-08-14

## 2017-08-14 DIAGNOSIS — J30.9 ALLERGIC RHINITIS, UNSPECIFIED ALLERGIC RHINITIS TRIGGER, UNSPECIFIED RHINITIS SEASONALITY: Primary | ICD-10-CM

## 2017-08-14 PROCEDURE — 95115 IMMUNOTHERAPY ONE INJECTION: CPT | Performed by: OTOLARYNGOLOGY

## 2017-08-14 NOTE — PROGRESS NOTES
ALLERGY SHOT PROCEDURE NOTE     ALLERGY TECHNICIAN:   Amy Connors RN    ALLERGY HISTORY OF PRESENT ILLNESS:   The patient is a 52 y.o., who returns for immunotherapy injection.   The patient overall has had an improvement of symptoms since the last injection. The patient has had a 90% improvement of nasal drainage, nasal congestion and epistaxis . That has lasted 6 days. She has had a return of nasal drainage, nasal congestion, sinus pressure, sneezing and itchy nose since the last injection.     INJECTION DETAILS:   The site of the injection was cleansed with an alcohol swab. Serum was injected into the site after pulling back on the plunger to prevent intravascular injection. The patient showed no signs of immediate reaction. After the injection and was instructed to wait in the allergy waiting area for 20 minutes. After waiting, the patient showed no signs of reaction and was allowed to leave.      Continuous maintenance series  Reaction: 3mm  Side :     left arm

## 2017-08-28 ENCOUNTER — CLINICAL SUPPORT (OUTPATIENT)
Dept: OTOLARYNGOLOGY | Facility: CLINIC | Age: 52
End: 2017-08-28

## 2017-08-28 DIAGNOSIS — J30.9 ALLERGIC RHINITIS, UNSPECIFIED ALLERGIC RHINITIS TRIGGER, UNSPECIFIED RHINITIS SEASONALITY: Primary | ICD-10-CM

## 2017-08-28 PROCEDURE — 95117 IMMUNOTHERAPY INJECTIONS: CPT | Performed by: OTOLARYNGOLOGY

## 2017-08-28 NOTE — PROGRESS NOTES
ALLERGY SHOT PROCEDURE NOTE     ALLERGY TECHNICIAN:   Amy Connors RN    ALLERGY HISTORY OF PRESENT ILLNESS:   The patient is a 52 y.o., who returns for immunotherapy injection.   The patient overall has had an improvement of symptoms since the last injection. The patient has had a 90% improvement of nasal drainage, nasal congestion, sneezing and itchy nose . That has lasted 6 days. She has had a return of nasal drainage, nasal congestion, sneezing and itchy nose since the last injection.     INJECTION DETAILS:   The site of the injection was cleansed with an alcohol swab. Serum was injected into the site after pulling back on the plunger to prevent intravascular injection. The patient showed no signs of immediate reaction. After the injection and was instructed to wait in the allergy waiting area for 20 minutes. After waiting, the patient showed no signs of reaction and was allowed to leave.      Maintenance series vial safety test  Reaction: 3mm  Side :     right arm    Reaction: 4mm  Side :     Left arm

## 2017-09-11 ENCOUNTER — CLINICAL SUPPORT (OUTPATIENT)
Dept: OTOLARYNGOLOGY | Facility: CLINIC | Age: 52
End: 2017-09-11

## 2017-09-11 ENCOUNTER — OFFICE VISIT (OUTPATIENT)
Dept: OTOLARYNGOLOGY | Facility: CLINIC | Age: 52
End: 2017-09-11

## 2017-09-11 VITALS
HEART RATE: 84 BPM | HEIGHT: 67 IN | DIASTOLIC BLOOD PRESSURE: 94 MMHG | SYSTOLIC BLOOD PRESSURE: 131 MMHG | BODY MASS INDEX: 35.47 KG/M2 | TEMPERATURE: 97.5 F | WEIGHT: 226 LBS

## 2017-09-11 DIAGNOSIS — H69.83 EUSTACHIAN TUBE DYSFUNCTION, BILATERAL: ICD-10-CM

## 2017-09-11 DIAGNOSIS — J30.9 ALLERGIC RHINITIS, UNSPECIFIED ALLERGIC RHINITIS TRIGGER, UNSPECIFIED RHINITIS SEASONALITY: Primary | ICD-10-CM

## 2017-09-11 DIAGNOSIS — J06.9 VIRAL URI: ICD-10-CM

## 2017-09-11 PROCEDURE — 99214 OFFICE O/P EST MOD 30 MIN: CPT | Performed by: OTOLARYNGOLOGY

## 2017-09-11 PROCEDURE — 95115 IMMUNOTHERAPY ONE INJECTION: CPT | Performed by: OTOLARYNGOLOGY

## 2017-09-11 RX ORDER — FLUTICASONE PROPIONATE 50 MCG
2 SPRAY, SUSPENSION (ML) NASAL DAILY
Qty: 1 BOTTLE | Refills: 3 | Status: SHIPPED | OUTPATIENT
Start: 2017-09-11 | End: 2018-01-02 | Stop reason: SDUPTHER

## 2017-09-11 RX ORDER — CETIRIZINE HYDROCHLORIDE 10 MG/1
10 TABLET ORAL DAILY
Qty: 30 TABLET | Refills: 3 | Status: SHIPPED | OUTPATIENT
Start: 2017-09-11

## 2017-09-11 NOTE — PROGRESS NOTES
Patient Care Team:  BLANCHE Timmons as PCP - General (Family Medicine)  Benedict Gupta MD as Consulting Physician (Otolaryngology)    Chief Complaint   Patient presents with   • Sinus Problem     sinus congestion, cough   • Ear Problem     feels like busted right eardrum       Subjective   HPI   Floridalma Stewart is a  52 y.o. female who is here for follow up. She has had a 3 day history of increased nasal congestion, postnasal drip, and cough.  A few weeks ago, she reports she was swimming and had water that went into the right ear.  She had self limited drainage at that time.  She currently reports no ear complaints.  She has been on immunotherapy for several years and is currently tolerating every other week injections.    Review of Systems:  Reviewed per patient intake note    Past History:  Past Medical History:   Diagnosis Date   • Allergic rhinitis    • Arthritis    • Chronic rhinitis    • Chronic sinusitis    • Eustachian tube dysfunction    • GERD (gastroesophageal reflux disease)    • Tobacco use disorder    • Vertigo      Past Surgical History:   Procedure Laterality Date   • APPENDECTOMY     • BREAST BIOPSY     •  SECTION     • CHOLECYSTECTOMY     • HYSTERECTOMY     • MYRINGOTOMY W/ TUBES Bilateral 2015   • TYMPANOPLASTY Left      Family History   Problem Relation Age of Onset   • Diabetes Mother    • Cancer Father      Social History   Substance Use Topics   • Smoking status: Current Every Day Smoker   • Smokeless tobacco: Never Used   • Alcohol use No       Current Outpatient Prescriptions:   •  amitriptyline (ELAVIL) 100 MG tablet, Take 100 mg by mouth., Disp: , Rfl:   •  aspirin 81 MG tablet, Take 81 mg by mouth daily., Disp: , Rfl:   •  atorvastatin (LIPITOR) 40 MG tablet, Take 40 mg by mouth., Disp: , Rfl:   •  baclofen (LIORESAL) 20 MG tablet, Take 20 mg by mouth., Disp: , Rfl:   •  busPIRone (BUSPAR) 15 MG tablet, Take 15 mg by mouth., Disp: , Rfl:   •  cetirizine (zyrTEC) 10  MG tablet, Take 1 tablet by mouth Daily., Disp: 30 tablet, Rfl: 3  •  FLUoxetine (PROzac) 40 MG capsule, Take 40 mg by mouth., Disp: , Rfl:   •  fluticasone (FLONASE) 50 MCG/ACT nasal spray, 2 sprays into each nostril Daily., Disp: 1 bottle, Rfl: 3  •  lisinopril (PRINIVIL,ZESTRIL) 10 MG tablet, Take 10 mg by mouth daily., Disp: , Rfl:   •  meclizine (ANTIVERT) 25 MG tablet, Take 25 mg by mouth 3 (three) times a day., Disp: , Rfl:   •  montelukast (SINGULAIR) 10 MG tablet, Take 1 tablet by mouth Every Night., Disp: 30 tablet, Rfl: 11  •  potassium chloride (KLOR-CON) 10 MEQ CR tablet, Take 10 mEq by mouth., Disp: , Rfl:   •  pseudoephedrine (SUDAFED) 60 MG tablet, Take 60 mg by mouth., Disp: , Rfl:   •  rizatriptan MLT (MAXALT-MLT) 10 MG disintegrating tablet, Take 10 mg by mouth., Disp: , Rfl:   •  zolpidem (AMBIEN) 10 MG tablet, Take 10 mg by mouth., Disp: , Rfl:   No current facility-administered medications for this visit.   Allergies:  Ciprofloxacin; Hydromorphone; Ibuprofen; Naproxen; Nsaids; Penicillins; and Tramadol    Objective     Vital Signs:  Temp:  [97.5 °F (36.4 °C)] 97.5 °F (36.4 °C)  Heart Rate:  [84] 84  BP: (131)/(94) 131/94    Physical Exam  CONSTITUTIONAL: well nourished, well-developed, alert, oriented, in no acute distress   COMMUNICATION AND VOICE: able to communicate normally for age, normal voice quality  HEAD: normocephalic, no lesions, atraumatic, no tenderness, no masses   FACE: appearance normal, no lesions, no tenderness, no deformities, facial motion symmetric  EYES: ocular motility normal, eyelids normal, orbits normal, no proptosis, conjunctiva normal , pupils equal, round   EARS:  Hearing: response to conversational voice normal bilaterally   External Ears: auricles without lesions  RIGHT EXTERNAL EAR CANAL: normal ear canals without stenosis or significant cerumen  LEFT EXTERNAL EAR CANAL: normal structure, no stenosis, no lesions, no drainage present, extruded tube present,  removed with handheld otoscope,  RIGHT TYMPANIC MEMBRANE: myringotomy tube in place, dry and patent  LEFT TYMPANIC MEMBRANE: moderate dullness present, myringosclerosis present,  NOSE:  External Nose: structure normal, no tenderness on palpation, no nasal discharge, no lesions, no evidence of trauma, nostrils patent   Intranasal exam: nasal mucosa with mucosal congestion and erythema, nasal septum relatively midline   ORAL:  Lips: upper and lower lips without lesion   NECK: neck appearance normal  CHEST/RESPIRATORY: respiratory effort normal, normal chest excursion  CARDIOVASCULAR: extremities without cyanosis or edema   NEUROLOGIC/PSYCHIATRIC: oriented appropriately for age, affect appropriate, CN II-XII intact grossly          Assessment   1. Allergic rhinitis, unspecified allergic rhinitis trigger, unspecified rhinitis seasonality    2. Eustachian tube dysfunction, bilateral    3. Viral URI        Plan   Continue current management plan. We will observe her extruded ear tube conservatively.  If she has symptoms on the left side, she may need repeat myringotomy tube insertion.  We will watch her viral URI.  If this progresses into a bacterial infection, we will consider antibiotic therapy.    -------MEDICATIONS:-------  New Medications Ordered This Visit   Medications   • fluticasone (FLONASE) 50 MCG/ACT nasal spray     Si sprays into each nostril Daily.     Dispense:  1 bottle     Refill:  3   • cetirizine (zyrTEC) 10 MG tablet     Sig: Take 1 tablet by mouth Daily.     Dispense:  30 tablet     Refill:  3        -----INSTRUCTIONS-----  Protect getting water in the ears. If needed, may use over the counter silicone plugs or a cotton ball coated with vasoline when bathing. If conservative measures are not working, consider obtaining molded earplugs from the audiology department to use while bathing or swimming.  For the best response, use your nasal sprays every day without skipping doses. It may take several  weeks before the full effect is acheived.   continue immunotherapy    Return in about 6 months (around 3/11/2018).    Benedict Gupta MD  09/11/17  11:17 AM

## 2017-09-11 NOTE — PROGRESS NOTES
ALLERGY SHOT PROCEDURE NOTE     ALLERGY TECHNICIAN:   Amy Connors RN    ALLERGY HISTORY OF PRESENT ILLNESS:   The patient is a 52 y.o., who returns for immunotherapy injection.   The patient overall has had an improvement of symptoms since the last injection. The patient has had a 90% improvement of nasal drainage, nasal congestion, sinus pressure, sneezing and itchy nose . That has lasted 6 days. She has had a return of nasal drainage, sinus pressure, sneezing and itchy nose since the last injection.     INJECTION DETAILS:   The site of the injection was cleansed with an alcohol swab. Serum was injected into the site after pulling back on the plunger to prevent intravascular injection. The patient showed no signs of immediate reaction. After the injection and was instructed to wait in the allergy waiting area for 20 minutes. After waiting, the patient showed no signs of reaction and was allowed to leave.    Reaction: 4mm  Side :     right arm

## 2017-09-11 NOTE — PROGRESS NOTES
Patient Intake Note    Review of Systems  Review of Systems   Constitutional: Negative for chills, fatigue and fever.   HENT:        See HPI     Respiratory: Positive for cough. Negative for choking, shortness of breath and wheezing.    Cardiovascular: Negative.    Gastrointestinal: Negative for constipation, diarrhea, nausea and vomiting.   Allergic/Immunologic: Negative for environmental allergies and food allergies.   Neurological: Negative for dizziness and headaches.   Hematological: Does not bruise/bleed easily.   Psychiatric/Behavioral: Negative for sleep disturbance.         Spring Griffin  9/11/2017  10:36 AM

## 2017-09-25 ENCOUNTER — CLINICAL SUPPORT (OUTPATIENT)
Dept: OTOLARYNGOLOGY | Facility: CLINIC | Age: 52
End: 2017-09-25

## 2017-09-25 DIAGNOSIS — J30.9 ALLERGIC RHINITIS, UNSPECIFIED ALLERGIC RHINITIS TRIGGER, UNSPECIFIED RHINITIS SEASONALITY: Primary | ICD-10-CM

## 2017-09-25 PROCEDURE — 95115 IMMUNOTHERAPY ONE INJECTION: CPT | Performed by: OTOLARYNGOLOGY

## 2017-09-25 NOTE — PROGRESS NOTES
ALLERGY SHOT PROCEDURE NOTE     ALLERGY TECHNICIAN:   Amy Connors RN    ALLERGY HISTORY OF PRESENT ILLNESS:   The patient is a 52 y.o., who returns for immunotherapy injection.   The patient overall has had an improvement of symptoms since the last injection. The patient has had a 90% improvement of nasal drainage, sneezing and itchy nose . That has lasted 7 days. She has had a return of sneezing and itchy nose since the last injection.     INJECTION DETAILS:   The site of the injection was cleansed with an alcohol swab. Serum was injected into the site after pulling back on the plunger to prevent intravascular injection. The patient showed no signs of immediate reaction. After the injection and was instructed to wait in the allergy waiting area for 20 minutes. After waiting, the patient showed no signs of reaction and was allowed to leave.    Reaction: 3mm  Side :     right arm

## 2017-10-23 ENCOUNTER — CLINICAL SUPPORT (OUTPATIENT)
Dept: OTOLARYNGOLOGY | Facility: CLINIC | Age: 52
End: 2017-10-23

## 2017-10-23 DIAGNOSIS — J30.89 OTHER ALLERGIC RHINITIS: Primary | ICD-10-CM

## 2017-10-23 PROCEDURE — 95115 IMMUNOTHERAPY ONE INJECTION: CPT | Performed by: OTOLARYNGOLOGY

## 2017-10-24 NOTE — PROGRESS NOTES
ALLERGY SHOT PROCEDURE NOTE     ALLERGY TECHNICIAN:   Sri Rudolph MA    ALLERGY HISTORY OF PRESENT ILLNESS:   The patient is a 52 y.o., who returns for immunotherapy injection.   The patient overall has had an improvement of symptoms since the last injection. The patient has had a 90% improvement of nasal drainage, sneezing and itchy nose . That has lasted 7 days. She has had a return of sneezing and itchy nose since the last injection.     INJECTION DETAILS:   The site of the injection was cleansed with an alcohol swab. Serum was injected into the site after pulling back on the plunger to prevent intravascular injection. The patient showed no signs of immediate reaction. After the injection and was instructed to wait in the allergy waiting area for 20 minutes. After waiting, the patient showed no signs of reaction and was allowed to leave.    Combined Maintenance Series Serum   Right Arm @ .50cc

## 2018-01-02 RX ORDER — FLUTICASONE PROPIONATE 50 MCG
SPRAY, SUSPENSION (ML) NASAL
Qty: 16 G | Refills: 3 | Status: SHIPPED | OUTPATIENT
Start: 2018-01-02

## 2020-03-04 ENCOUNTER — OFFICE VISIT (OUTPATIENT)
Dept: OTOLARYNGOLOGY | Facility: CLINIC | Age: 55
End: 2020-03-04

## 2020-03-04 VITALS
TEMPERATURE: 98 F | HEIGHT: 67 IN | HEART RATE: 84 BPM | SYSTOLIC BLOOD PRESSURE: 143 MMHG | WEIGHT: 220 LBS | DIASTOLIC BLOOD PRESSURE: 79 MMHG | RESPIRATION RATE: 20 BRPM | BODY MASS INDEX: 34.53 KG/M2

## 2020-03-04 DIAGNOSIS — J30.9 ALLERGIC RHINITIS, UNSPECIFIED SEASONALITY, UNSPECIFIED TRIGGER: ICD-10-CM

## 2020-03-04 DIAGNOSIS — H69.83 DYSFUNCTION OF BOTH EUSTACHIAN TUBES: Primary | ICD-10-CM

## 2020-03-04 DIAGNOSIS — H66.93 CHRONIC OTITIS MEDIA OF BOTH EARS: ICD-10-CM

## 2020-03-04 DIAGNOSIS — Z72.0 TOBACCO ABUSE DISORDER: ICD-10-CM

## 2020-03-04 PROCEDURE — 99214 OFFICE O/P EST MOD 30 MIN: CPT | Performed by: NURSE PRACTITIONER

## 2020-03-04 PROCEDURE — 92567 TYMPANOMETRY: CPT | Performed by: NURSE PRACTITIONER

## 2020-03-04 RX ORDER — NYSTATIN 100000 [USP'U]/G
POWDER TOPICAL 4 TIMES DAILY
COMMUNITY
End: 2022-07-14

## 2020-03-04 RX ORDER — SPIRONOLACTONE 25 MG/1
25 TABLET ORAL DAILY
COMMUNITY
End: 2022-07-14

## 2020-03-04 RX ORDER — ALBUTEROL SULFATE 90 UG/1
AEROSOL, METERED RESPIRATORY (INHALATION)
COMMUNITY
Start: 2020-02-26 | End: 2022-07-14

## 2020-03-04 RX ORDER — TIOTROPIUM BROMIDE INHALATION SPRAY 1.56 UG/1
SPRAY, METERED RESPIRATORY (INHALATION)
COMMUNITY
Start: 2020-02-26

## 2020-03-04 RX ORDER — ALBUTEROL SULFATE 2.5 MG/3ML
SOLUTION RESPIRATORY (INHALATION)
COMMUNITY
Start: 2020-02-28

## 2020-03-04 RX ORDER — SPIRONOLACTONE 25 MG/1
TABLET ORAL
COMMUNITY
Start: 2019-12-28 | End: 2022-07-14

## 2020-03-04 RX ORDER — ENALAPRIL MALEATE 2.5 MG/1
2.5 TABLET ORAL DAILY
COMMUNITY
Start: 2019-09-05

## 2020-03-04 RX ORDER — OXYCODONE HYDROCHLORIDE 10 MG/1
10 TABLET ORAL
COMMUNITY
End: 2022-07-14

## 2020-03-04 RX ORDER — AMILORIDE HYDROCHLORIDE 5 MG/1
TABLET ORAL
COMMUNITY
Start: 2019-09-05

## 2020-03-04 NOTE — PROGRESS NOTES
PRIMARY CARE PROVIDER: Alice Bashir APRN  REFERRING PROVIDER: No ref. provider found    Chief Complaint   Patient presents with   • Follow-up     Ear fullness       Subjective   History of Present Illness:  Floridalma Stewart is a  55 y.o. female  who presents for follow up s/p bilateral myringotomy tube insertion on 2017. The patient has had a relatively normal postoperative course. The patient has had complaints of otalgia, ear fullness, ear pressure, chronic fluid on the ear and muffled hearing. The symptoms are localized to both ears. The symptoms severity was described as: moderate The symptoms have been: present for the last 4 months There have been no identified factors that aggravate the symptoms. The symptoms are improved by myringotomy tube insertion. .  She has been taking Flonase and Zyrtec daily without improvement in symptoms.  She has a previous history of immunotherapy and BMT x 2 by Dr. Gupta.    Review of Systems:  Review of Systems   Constitutional: Negative for chills and fever.   HENT: Positive for congestion, ear pain, hearing loss and rhinorrhea. Negative for ear discharge, sore throat and voice change.    Respiratory: Negative for cough and shortness of breath.    Cardiovascular: Negative for chest pain.   Gastrointestinal: Negative for diarrhea, nausea and vomiting.   Musculoskeletal: Positive for arthralgias.   Allergic/Immunologic: Positive for environmental allergies.       Past History:  Past Medical History:   Diagnosis Date   • Allergic rhinitis    • Arthritis    • Chronic rhinitis    • Chronic sinusitis    • Eustachian tube dysfunction    • GERD (gastroesophageal reflux disease)    • Tobacco use disorder    • Vertigo      Past Surgical History:   Procedure Laterality Date   • APPENDECTOMY     • BREAST BIOPSY     •  SECTION     • CHOLECYSTECTOMY     • HYSTERECTOMY     • MYRINGOTOMY W/ TUBES Bilateral 2015   • TYMPANOPLASTY Left      Family History   Problem  Relation Age of Onset   • Diabetes Mother    • Cancer Father      Social History     Tobacco Use   • Smoking status: Current Every Day Smoker   • Smokeless tobacco: Never Used   Substance Use Topics   • Alcohol use: No   • Drug use: No     Allergies:  Acetaminophen; Ciprofloxacin; Hydromorphone; Ibuprofen; Naproxen; Nsaids; Penicillins; Tramadol; and Bromfenac    Current Outpatient Medications:   •  albuterol (PROVENTIL) (2.5 MG/3ML) 0.083% nebulizer solution, , Disp: , Rfl:   •  albuterol sulfate  (90 Base) MCG/ACT inhaler, , Disp: , Rfl:   •  aMILoride (MIDAMOR) 5 MG tablet, , Disp: , Rfl:   •  amitriptyline (ELAVIL) 100 MG tablet, Take 100 mg by mouth., Disp: , Rfl:   •  aspirin 81 MG tablet, Take 81 mg by mouth daily., Disp: , Rfl:   •  atorvastatin (LIPITOR) 40 MG tablet, Take 40 mg by mouth., Disp: , Rfl:   •  baclofen (LIORESAL) 20 MG tablet, Take 20 mg by mouth., Disp: , Rfl:   •  busPIRone (BUSPAR) 15 MG tablet, Take 15 mg by mouth., Disp: , Rfl:   •  cetirizine (zyrTEC) 10 MG tablet, Take 1 tablet by mouth Daily., Disp: 30 tablet, Rfl: 3  •  enalapril (VASOTEC) 2.5 MG tablet, Take 2.5 mg by mouth Daily., Disp: , Rfl:   •  FLUoxetine (PROzac) 40 MG capsule, Take 40 mg by mouth., Disp: , Rfl:   •  fluticasone (FLONASE) 50 MCG/ACT nasal spray, SPRAY TWO SPRAYS IN EACH NOSTRIL ONCE DAILY FOR SINUSES, Disp: 16 g, Rfl: 3  •  lisinopril (PRINIVIL,ZESTRIL) 10 MG tablet, Take 10 mg by mouth daily., Disp: , Rfl:   •  meclizine (ANTIVERT) 25 MG tablet, Take 25 mg by mouth 3 (three) times a day., Disp: , Rfl:   •  montelukast (SINGULAIR) 10 MG tablet, Take 1 tablet by mouth Every Night., Disp: 30 tablet, Rfl: 11  •  nystatin (MYCOSTATIN) 964570 UNIT/GM powder, Apply  topically to the appropriate area as directed 4 (Four) Times a Day., Disp: , Rfl:   •  oxyCODONE (ROXICODONE) 10 MG tablet, Take 10 mg by mouth., Disp: , Rfl:   •  potassium chloride (KLOR-CON) 10 MEQ CR tablet, Take 10 mEq by mouth., Disp: , Rfl:    •  pseudoephedrine (SUDAFED) 60 MG tablet, Take 60 mg by mouth., Disp: , Rfl:   •  rizatriptan MLT (MAXALT-MLT) 10 MG disintegrating tablet, Take 10 mg by mouth., Disp: , Rfl:   •  SPIRIVA RESPIMAT 1.25 MCG/ACT aerosol solution inhaler, , Disp: , Rfl:   •  spironolactone (ALDACTONE) 25 MG tablet, Take 25 mg by mouth Daily., Disp: , Rfl:   •  zolpidem (AMBIEN) 10 MG tablet, Take 10 mg by mouth., Disp: , Rfl:   •  spironolactone (ALDACTONE) 25 MG tablet, , Disp: , Rfl:       Objective     Vital Signs:  Temp:  [98 °F (36.7 °C)] 98 °F (36.7 °C)  Heart Rate:  [84] 84  Resp:  [20] 20  BP: (143)/(79) 143/79    Physical Exam:  Physical Exam  CONSTITUTIONAL: well nourished, well-developed, alert, oriented, in no acute distress   COMMUNICATION AND VOICE: able to communicate normally, normal voice quality  HEAD: normocephalic, no lesions, atraumatic, no tenderness, no masses   FACE: appearance normal, no lesions, no tenderness, no deformities, facial motion symmetric  SALIVARY GLANDS: parotid glands with no tenderness, no swelling, no masses, submandibular glands with normal size, nontender  EYES: ocular motility normal, eyelids normal, orbits normal, no proptosis, conjunctiva normal , pupils equal, round   EARS:  Hearing: response to conversational voice normal bilaterally   External Ears: auricles without lesions  Otoscopic: right tympanic membrane with myringosclerosis, no lesions, no perforation, normal mobility, no fluid, type A tympanogram; left tympanic membrane with myringosclerosis and dullness, type B flat tympanogram  NOSE:  External Nose: structure normal, no tenderness on palpation, no nasal discharge, no lesions, no evidence of trauma, nostrils patent   Intranasal Exam: nasal mucosa with congestion and erythema, nasal septum is relatively midline.  ORAL:  Lips: upper and lower lips without lesion   Teeth: Dentures not removed for exam  Gums: gingivae healthy   Oral Mucosa: oral mucosa normal, no mucosal lesions    Floor of Mouth: Warthin’s duct patent, mucosa normal  Tongue: lingual mucosa normal without lesions, normal tongue mobility   Palate: soft and hard palates with normal mucosa and structure  Oropharynx: oropharyngeal mucosa normal  NECK: neck appearance normal, no masses or tenderness  CHEST/RESPIRATORY: respiratory effort normal, normal breath sounds   CARDIOVASCULAR: rate and rhythm normal, extremities without cyanosis or edema    NEUROLOGIC/PSYCHIATRIC: oriented to time, place and person, mood normal, affect appropriate, CN II-XII intact grossly      I performed tympanograms on the bilateral ears to measure the middle ear pressure. The results were: Type A tympanogram on the right and type B flat tympanogram on the left. H69.83      Assessment   Assessment:  1. Dysfunction of both eustachian tubes    2. Chronic otitis media of both ears    3. Allergic rhinitis, unspecified seasonality, unspecified trigger    4. Tobacco abuse disorder        Plan   Plan:    Continue current medication regimen.  Patient requests myringotomy tube replacement.  I will have her follow-up with Dr. Gupta for possible bilateral myringotomy tube insertion.    MYRINGOTOMY TUBE INSERTION: The risks and benefits of myringotomy tube insertion were explained including but not limited to pain, aural fullness, bleeding, infection, risks of the anesthesia, persistent tympanic membrane perforation, chronic otorrhea, early and late extrusion, and the possibility for the need of reinsertion after extrusion. Alternatives were discussed. The patient/parents demonstrated understanding of these risks. Questions were asked appropriately answered.      The patient was counseled about the effects of smoking on the patients overall health. Risks of worsening of health, increased risks of cancer and poor surgical outcomes were discussed.       Return in about 4 weeks (around 4/1/2020) for Recheck, With Dr. Gupta, With Audio.    My findings and  recommendations were discussed and questions were answered.     Cassi Beard, BLANCHE  03/04/20  5:20 PM

## 2020-05-04 ENCOUNTER — OFFICE VISIT (OUTPATIENT)
Dept: OTOLARYNGOLOGY | Facility: CLINIC | Age: 55
End: 2020-05-04

## 2020-05-04 VITALS
WEIGHT: 228 LBS | HEIGHT: 67 IN | SYSTOLIC BLOOD PRESSURE: 125 MMHG | TEMPERATURE: 98 F | BODY MASS INDEX: 35.79 KG/M2 | DIASTOLIC BLOOD PRESSURE: 72 MMHG | HEART RATE: 98 BPM

## 2020-05-04 DIAGNOSIS — H69.83 EUSTACHIAN TUBE DYSFUNCTION, BILATERAL: Primary | ICD-10-CM

## 2020-05-04 PROCEDURE — 69433 CREATE EARDRUM OPENING: CPT | Performed by: OTOLARYNGOLOGY

## 2020-05-04 PROCEDURE — 99213 OFFICE O/P EST LOW 20 MIN: CPT | Performed by: OTOLARYNGOLOGY

## 2020-05-04 NOTE — PROGRESS NOTES
Amy Connors RN   Patient Intake Note    Review of Systems  Review of Systems   Constitutional: Negative for chills and fever.   HENT: Positive for ear pain. Negative for ear discharge.    Respiratory: Negative for cough, choking and shortness of breath.    Gastrointestinal: Negative for diarrhea, nausea and vomiting.   Musculoskeletal: Negative for neck pain and neck stiffness.   Neurological: Positive for headaches. Negative for dizziness and light-headedness.   Hematological: Bruises/bleeds easily.   Psychiatric/Behavioral: Negative for sleep disturbance.   All other systems reviewed and are negative.      Tobacco Use: Screening and Cessation Intervention  Social History    Tobacco Use      Smoking status: Current Every Day Smoker      Smokeless tobacco: Never Used        Amy Connors RN  5/4/2020  09:15

## 2020-05-04 NOTE — PROGRESS NOTES
Benedict Gupta MD     Chief Complaint   Patient presents with   • Earache        History of Present Illness  Floridalma Stewart is a  55 y.o. female who is here for follow up.  She has a history of bilateral myringotomy tube insertion in the past.  She returned recently with increased symptoms on the left side with findings consistent with fluid in the ear.  She desires myringotomy tube reinsertion.    Review of Systems  Reviewed per patient intake note    Past History:   Past medical and surgical history, family history and social history reviewed and updated when appropriate.  Current medications and allergies reviewed and updated when appropriate.  Allergies:  Acetaminophen; Ciprofloxacin; Hydromorphone; Ibuprofen; Naproxen; Nsaids; Penicillins; Tramadol; and Bromfenac        Vital Signs:   Temp:  [98 °F (36.7 °C)] 98 °F (36.7 °C)  Heart Rate:  [98] 98  BP: (125)/(72) 125/72    Physical Exam:  CONSTITUTIONAL: well nourished, well-developed, alert, oriented, in no acute distress   COMMUNICATION AND VOICE: able to communicate normally, normal voice quality  HEAD: normocephalic, no lesions, atraumatic, no tenderness, no masses   FACE: appearance normal, no lesions, no tenderness, no deformities, facial motion symmetric  EYES: ocular motility normal, eyelids normal, orbits normal, no proptosis, conjunctiva normal , pupils equal, round  HEARING: response to conversational voice normal bilaterally   EXTERNAL EARS: auricles without lesions  EXTERNAL EAR CANAL: No stenosis, no significant cerumen.  No lesions.  TYMPANIC MEMBRANE: There is myringosclerosis change bilaterally.  There is mild retraction.  There is no evidence of effusion.  There is slight decrease in mobility.  EXTERNAL NOSE: structure normal, no tenderness on palpation, no nasal discharge, no lesions, no evidence of trauma, nostrils patent  LIPS: structure normal, no tenderness on palpation, no lesions, no evidence of trauma  NECK: neck appearance  normal  LYMPH NODES: no lymphadenopathy  CHEST/RESPIRATORY: respiratory effort normal  CARDIOVASCULAR: extremities without cyanosis or edema, no overt jugulovenous distension present  NEUROLOGIC/PSYCHIATRIC: oriented appropriately for age, mood normal, affect appropriate, cranial nerves intact grossly unless specifically mentioned above     RESULTS REVIEW:    I have reviewed the patients old records in the chart.        Assessment/Plan    Assessment:    1. Eustachian tube dysfunction, bilateral        Plan:    Medical and surgical options were discussed including continued medical management vs myringotomy trial with possible myringotomy tube insertion. Risks, benefits and alternatives were discussed and questions were answered.After considering the options, it was decided that myringotomy tube insertion was the best option.  Possibilities of no improvement after tube insertion, incomplete improvement after tube insertion and possibilities of worsening of symptoms with myringotomy tube insertion were discussed and understood.    Bilateral Myringotomy Tube Insertion  Date/Time: 5/4/2020 9:04 AM  Performed by: Benedict Gupta MD  Authorized by: Benedict Gupta MD     Informed Consent:   Consent for the procedure was given by the patient. The risks and benefits of the procedure were discussed, including but not limited to tympanic membrane perforation, early or late tube extrusion, aural fullness, otorrhea and hearing loss/ tinnitus. Alternatives were discussed, including alternative treatments and observation. After the discussion of the risks and benefits, questions were allowed and answered until understanding was demonstrated. Consent to perform the procedure was obtained by written consent consent.     Anesthesia (see MAR for exact dosages):    Local anesthetic:     Total CC used: 0.1Local anesthetic: phenol.    Indications:  Bilateral myringotomy tube insertion was felt to be indicated for eustachian  tube dysfunction.     Procedure:  The ear canal and tympanic membrane were visualized with the otologic microscope. A bilateral myringotomy tube insertion was performed. After anesthesia, an incision was made in the bilateral posterior inferior tympanic membrane. The middle ear was inspected and noted to have normal mucosa. After suctioning, a beveled Day modified tube was then placed in the myringotomy site. There was noted to be no difficulty placing the tube. The procedure was tolerated well with no immediate complications.          Patient Instructions    Protect getting water in the ears. If needed, may use over the counter silicone plugs or a cotton ball coated with vasoline when bathing. If conservative measures are not working, consider obtaining molded earplugs from the audiology department to use while bathing or swimming.        Orders Placed This Encounter   Procedures   • Bilateral Myringotomy Tube Insertion       Return in about 4 months (around 9/4/2020).       Benedict Gupta MD  05/04/20  10:38

## 2020-05-04 NOTE — PATIENT INSTRUCTIONS
IF YOU SMOKE OR USE TOBACCO PLEASE READ THE FOLLOWING:  Why is smoking bad for me?  Smoking increases the risk of heart disease, lung disease, vascular disease, stroke, and cancer. If you smoke, STOP!      For more information:  [place] [employer] QuitLine  1-800-QUIT-NOW  http://www.tnquitline.org

## 2020-06-25 ENCOUNTER — TELEPHONE (OUTPATIENT)
Dept: OTOLARYNGOLOGY | Facility: CLINIC | Age: 55
End: 2020-06-25

## 2020-06-25 RX ORDER — NEOMYCIN SULFATE, POLYMYXIN B SULFATE, HYDROCORTISONE 3.5; 10000; 1 MG/ML; [USP'U]/ML; MG/ML
3 SOLUTION/ DROPS AURICULAR (OTIC) 2 TIMES DAILY
Qty: 10 ML | Refills: 0 | Status: SHIPPED | OUTPATIENT
Start: 2020-06-25 | End: 2020-10-15 | Stop reason: SDUPTHER

## 2020-06-25 NOTE — TELEPHONE ENCOUNTER
"Patient called stating that the audiologist who performs her yearly hearing test for her work stated that her ear was red and tube \"didn't look right\".  She states she is not in any pain and it doesn't really bother her.  Sent drops in to pharmacy for her to use for a few days to clear up the inflammation.   "

## 2020-09-09 ENCOUNTER — OFFICE VISIT (OUTPATIENT)
Dept: OTOLARYNGOLOGY | Facility: CLINIC | Age: 55
End: 2020-09-09

## 2020-09-09 VITALS
WEIGHT: 214.2 LBS | HEIGHT: 67 IN | DIASTOLIC BLOOD PRESSURE: 86 MMHG | BODY MASS INDEX: 33.62 KG/M2 | TEMPERATURE: 96.9 F | SYSTOLIC BLOOD PRESSURE: 120 MMHG | HEART RATE: 86 BPM

## 2020-09-09 DIAGNOSIS — H92.02 OTALGIA, LEFT: ICD-10-CM

## 2020-09-09 DIAGNOSIS — T85.898A VENTILATION TUBE BLOCKED, INITIAL ENCOUNTER: ICD-10-CM

## 2020-09-09 DIAGNOSIS — Z96.22 S/P MYRINGOTOMY WITH INSERTION OF TUBE: ICD-10-CM

## 2020-09-09 DIAGNOSIS — H69.83 DYSFUNCTION OF EUSTACHIAN TUBE, BILATERAL: ICD-10-CM

## 2020-09-09 PROCEDURE — 99213 OFFICE O/P EST LOW 20 MIN: CPT | Performed by: OTOLARYNGOLOGY

## 2020-09-09 NOTE — PROGRESS NOTES
Chief Complaint   Patient presents with   • Ear Problem       Subjective   History of Present Illness:  Floridalma Stewart is a 55 y.o. female who presents status post myringotomy tube insertion. The patient has had: otalgia. She has had left ear stopped up sensation for the last several weeks.    Review of Systems   HENT: No otorrhea, hearing change; CONSTITUTIONAL: No fever, chills; GI: no nausea    Past History:  History reviewed on the chart and updated as necessary.  Allergies:  Acetaminophen; Ciprofloxacin; Hydromorphone; Ibuprofen; Naproxen; Nsaids; Penicillins; Tramadol; and Bromfenac     Objective   Vital Signs  Temp:  [96.9 °F (36.1 °C)] 96.9 °F (36.1 °C)  Heart Rate:  [86] 86  BP: (120)/(86) 120/86    Physical Exam:  CONSTITUTIONAL: well nourished, well-developed, alert, oriented, in no acute distress   COMMUNICATION AND VOICE: able to communicate normally for age, normal voice quality  HEAD: normocephalic, no lesions, atraumatic, no tenderness, no masses   FACE: appearance normal, no lesions, no tenderness, no deformities, facial motion symmetric  EYES: ocular motility normal, eyelids normal, orbits normal, no proptosis, conjunctiva normal , pupils equal, round   EARS:  HEARING: response to conversational voice normal bilaterally   EXTERNAL EAR: auricles without lesions  EXTERNAL AUDITORY CANAL: ear canals normal, no obstruction  RIGHT TYMPANIC MEMBRANE: myringotomy tube in place, dry and patent  LEFT TYMPANIC MEMBRANE: myringotomy tube findings: in place, obstructed with crusting,  EXTERNAL NOSE: structure normal, no tenderness on palpation, no nasal discharge, no lesions, no evidence of trauma, nostrils patent   INTERNAL NOSE: no discharge, swelling or lesions  LIPS: upper and lower lips without lesion   OROPHARYNX: mucosa normal, no inflammation,  NECK: neck appearance normal  CHEST/RESPIRATORY: respiratory effort normal, normal chest excursion  CARDIOVASCULAR: extremities without cyanosis or edema    NEUROLOGIC/PSYCHIATRIC: oriented appropriately, mood normal, affect appropriate, CN II-XII intact grossly     Results Review:  I have reviewed the patient's old records in the chart.           Assessment/Plan   Assessment:   1. Dysfunction of Eustachian tube, bilateral    2. S/P myringotomy with insertion of tube    3. Ventilation tube blocked, initial encounter    4. Otalgia, left        Plan:   Dry ear precautions.Peroxide to the left ear until it starts to burn to open the tube        I discussed the patients findings and my recommendations and answered questions.      Return in 4 months (on 1/9/2021).     Benedict Gupta MD  09/09/20  10:16

## 2020-09-09 NOTE — PATIENT INSTRUCTIONS
Peroxide to the left ear until it starts to burn to open the tube    CONTACT INFORMATION:  The main office phone number is [phone]. For emergencies after hours and on weekends, this number will convert over to our answering service and the on call provider will answer. Please try to keep non emergent phone calls/ questions to office hours 9am-5pm Monday through Friday.     THUBIT  As an alternative, you can sign up and use the Epic MyChart system for more direct and quicker access for non emergent questions/ problems.  [facility] THUBIT allows you to send messages to your doctor, view your test results, renew your prescriptions, schedule appointments, and more. To sign up, go to [place] and click on the Sign Up Now link in the New User? box. Enter your THUBIT Activation Code exactly as it appears below along with the last four digits of your Social Security Number and your Date of Birth () to complete the sign-up process. If you do not sign up before the expiration date, you must request a new code.    THUBIT Activation Code: [email or web address]  Expires: 10/9/2020  9:08 AM    If you have questions, you can email [email or web address] or call [phone] to talk to our THUBIT staff. Remember, THUBIT is NOT to be used for urgent needs. For medical emergencies, dial 911.    IF YOU SMOKE OR USE TOBACCO PLEASE READ THE FOLLOWING:  Why is smoking bad for me?  Smoking increases the risk of heart disease, lung disease, vascular disease, stroke, and cancer. If you smoke, STOP!      For more information:  [place] [facility]uit[facility]  -QUIT-NOW  http://www.tnquitline.org

## 2020-10-15 RX ORDER — NEOMYCIN SULFATE, POLYMYXIN B SULFATE, HYDROCORTISONE 3.5; 10000; 1 MG/ML; [USP'U]/ML; MG/ML
3 SOLUTION/ DROPS AURICULAR (OTIC) 2 TIMES DAILY
Qty: 10 ML | Refills: 0 | Status: SHIPPED | OUTPATIENT
Start: 2020-10-15 | End: 2022-07-14

## 2020-10-15 NOTE — TELEPHONE ENCOUNTER
Patient calls stating that she is having drainage from the left ear.  Per Darshan JUÁREZ, sending in drops to pharmacy for patient to use.

## 2021-01-11 ENCOUNTER — OFFICE VISIT (OUTPATIENT)
Dept: OTOLARYNGOLOGY | Facility: CLINIC | Age: 56
End: 2021-01-11

## 2021-01-11 VITALS — HEIGHT: 67 IN | BODY MASS INDEX: 35.22 KG/M2 | TEMPERATURE: 97.1 F | WEIGHT: 224.4 LBS

## 2021-01-11 DIAGNOSIS — Z96.22 S/P MYRINGOTOMY WITH INSERTION OF TUBE: ICD-10-CM

## 2021-01-11 DIAGNOSIS — H69.83 DYSFUNCTION OF EUSTACHIAN TUBE, BILATERAL: Primary | ICD-10-CM

## 2021-01-11 PROCEDURE — 69433 CREATE EARDRUM OPENING: CPT | Performed by: OTOLARYNGOLOGY

## 2021-01-11 PROCEDURE — 99213 OFFICE O/P EST LOW 20 MIN: CPT | Performed by: OTOLARYNGOLOGY

## 2021-01-11 NOTE — PROGRESS NOTES
Procedure   Left Myringotomy Tube Insertion    Date/Time: 1/11/2021 2:12 PM  Performed by: Benedict Gupta MD  Authorized by: Benedict Gupta MD     Informed Consent:   Consent for the procedure was given by the patient. The risks and benefits of the procedure were discussed, including but not limited to tympanic membrane perforation, early or late tube extrusion, aural fullness, otorrhea and hearing loss/ tinnitus. Alternatives were discussed, including alternative treatments and observation. After the discussion of the risks and benefits, questions were allowed and answered until understanding was demonstrated. Consent to perform the procedure was obtained by written consent consent.     Anesthesia (see MAR for exact dosages):    Local anesthetic:     Total CC used: 0.1Local anesthetic: phenol.    Indications:  Left myringotomy tube insertion    Procedure:  The ear canal and tympanic membrane were visualized with the otologic microscope. A left myringotomy tube insertion was performed. After anesthesia, an incision was made in the left posterior inferior tympanic membrane. The middle ear was inspected and noted to have normal mucosa. After suctioning, a beveled Day modified tube was then placed in the myringotomy site. There was noted to be no difficulty placing the tube. The procedure was tolerated well with no immediate complications.

## 2021-01-11 NOTE — PROGRESS NOTES
Benedict Gupta MD  [facility]  [address] [place] [address]  [phone] office  [phone] fax      Chief Complaint   Patient presents with   • Ear Tube Follow-up       Subjective   History of Present Illness:  Floridalma Stewart is a 55 y.o. female who presents status post myringotomy tube insertion. The patient has had: otalgia, ear fullness, ear pressure, ear itching, imbalance and change in hearing The symptoms are localized to the left side. The symptoms severity was described as: moderate The symptoms have been: relatively constant for the last several months There have been no identified factors that aggravate the symptoms. There have been no factors that have improved the symptoms. . She is s/p bmt on 2/15/17.      Vital Signs  Temp:  [97.1 °F (36.2 °C)] 97.1 °F (36.2 °C)    Physical Exam  HENT:      Right Ear: Ear canal and external ear normal. Decreased hearing noted. A PE tube is present.      Left Ear: Ear canal and external ear normal. Decreased hearing noted.      Ears:      Comments: There is an intact left tympanic membrane with myringosclerosis change.  There is no tube present  Neurological:      Mental Status: She is alert.          RESULTS REVIEW:    I have reviewed the patient's old records in the chart.  I reviewed the patient's new clinical results.     Assessment/Plan    Assessment:    1. Dysfunction of Eustachian tube, bilateral    2. S/P myringotomy with insertion of tube        Plan:    Medical and surgical options were discussed including continued medical management vs myringotomy trial with possible myringotomy tube insertion. Risks, benefits and alternatives were discussed and questions were answered.After considering the options, it was decided that myringotomy tube insertion was the best option.  Possibilities of no improvement after tube insertion, incomplete improvement after tube insertion and possibilities of worsening of  symptoms with myringotomy tube insertion were discussed and understood.    Patient Instructions    Protect getting water in the ears. If needed, may use over the counter silicone plugs or a cotton ball coated with vasoline when bathing. If conservative measures are not working, consider obtaining molded earplugs from the audiology department to use while bathing or swimming.           Return in about 3 months (around 4/11/2021) for follow up with Pricilla MANCIA.       Benedict Gupta MD  01/11/21  14:12 CST

## 2022-03-14 ENCOUNTER — OFFICE VISIT (OUTPATIENT)
Dept: OTOLARYNGOLOGY | Facility: CLINIC | Age: 57
End: 2022-03-14

## 2022-03-14 VITALS
TEMPERATURE: 97.8 F | SYSTOLIC BLOOD PRESSURE: 160 MMHG | BODY MASS INDEX: 35.24 KG/M2 | DIASTOLIC BLOOD PRESSURE: 101 MMHG | WEIGHT: 225 LBS | HEART RATE: 96 BPM

## 2022-03-14 DIAGNOSIS — Z96.22 S/P MYRINGOTOMY WITH INSERTION OF TUBE: ICD-10-CM

## 2022-03-14 DIAGNOSIS — H69.83 DYSFUNCTION OF BOTH EUSTACHIAN TUBES: Primary | ICD-10-CM

## 2022-03-14 DIAGNOSIS — H92.02 OTALGIA, LEFT: ICD-10-CM

## 2022-03-14 DIAGNOSIS — H72.92 PERFORATION OF LEFT TYMPANIC MEMBRANE: ICD-10-CM

## 2022-03-14 PROCEDURE — 69433 CREATE EARDRUM OPENING: CPT | Performed by: OTOLARYNGOLOGY

## 2022-03-14 PROCEDURE — 99213 OFFICE O/P EST LOW 20 MIN: CPT | Performed by: OTOLARYNGOLOGY

## 2022-03-14 NOTE — ASSESSMENT & PLAN NOTE
We will follow the left perforation as if it is a tube.  I encouraged her to do dry ear precautions to try to prevent moisture that can cause drainage.

## 2022-06-06 ENCOUNTER — TELEPHONE (OUTPATIENT)
Dept: NEUROLOGY | Age: 57
End: 2022-06-06

## 2022-06-09 ENCOUNTER — TELEPHONE (OUTPATIENT)
Dept: NEUROLOGY | Age: 57
End: 2022-06-09

## 2022-06-09 NOTE — TELEPHONE ENCOUNTER
Established Patient    Amadou presents today with the following:    CC: Annual wellness visit    HPI: This patient is a very pleasant 73-year-old male with past medical history of acquired hypothyroidism, hyperlipidemia, essential hypertension, erectile dysfunction reported in clinic today for his annual wellness visit and medication refills.    Essential hypertension:   Patient has been on hydrochlorothiazide 12.5 mg daily a stable dose for several years. Blood pressure in office today is 128/92 indicating possible slight elevation in diastolic pressure, however his last measurement (10/13/2016) was 112/60. He is tolerating his medication regimen well.    Lumbar radiculopathy:  The patient has been seen by neurosurgery (Akbar), who referred him to [facility] (Dr. Person) for lumbar spinal fusion. My interpretation of his MRI lumbar spine performed 5/4/2017 is severe multilevel degenerative changes in the lumbar spine with impingement of neurologic structures. There is also a poorly defined (due to modality) structure on his right kidney which could be consistent with a renal cyst versus less likely malignant process. The patient denies cheryl hematuria.    Erectile dysfunction:  The patient has had satisfactory results with use of oral Cialis 20 mg, however has had recent difficulty acquiring it from the pharmacy due either to lack of refill or insurance issues. We will represcribe Cialis, can consider alternate therapy if preferred by insurance (i.e. Viagra, Levitra).    Acquired hypothyroidism:  Patient on levothyroxine 137 µg daily for acquired hypothyroidism. He reports no symptoms of heat/cold intolerance, hair loss, fatigue, weight gain/loss. He is tolerating his medication regimen well.    He is still enjoying work as a , and in his free time is pursuing a master's degree in judicial affairs. He attends the gym 5-6 days per week for exercise, and reports his diet is healthy. His depression  I mailed the PT a welcome letter today 6/9/22 screen is negative (pH Q9 equals 4, largely sleep-related due to pain from degenerative spine changes). He has no obstacles to self-care including nutritional, transportation, financial management. He has no cognitive impairment. His preventative health immunizations including zoster, pneumonia, influenza are up-to-date. He believes that his colonoscopy is up-to-date as well, however we are acquiring records from [facility].    Family History   Problem Relation Age of Onset   • Cancer Mother      Liver   • Heart Disease Mother    • Diabetes Mother    • Cancer Father      Leukemia    • Heart Disease Father    • Diabetes Father    • Stroke Father      Past Surgical History   Procedure Laterality Date   • Other neurological surg       low back surgery x 3   • Cervical laminectomy posterior  2011   • Other       nasal surgery 2015   • Lumbar laminectomy diskectomy Right 3/12/2016     Procedure: LUMBAR HemiLAMINECTOMY Micro DISKECTOMY posterior Redo L3-4 ;  Surgeon: Be Webber M.D.;  Location: [facility];  Service:    • Foraminotomy Right 3/12/2016     Procedure: FORAMINOTOMY;  Surgeon: Be Webber M.D.;  Location: [facility];  Service:    • Pr inj,foramen,l/s,1 level Right 8/18/2016     Procedure: INJ-FORAMEN EPI LUM/SAC SNGL - L5-S1;  Surgeon: Eugenio Camara M.D.;  [place];  Service: Pain Management   • Pr inj dx/ther agnt paravert facet joint, reno* Right 10/6/2016     Procedure: INJ PARA FACET L/S 1 LVL W/IG - L3-4, L4-5, L5-S1;  Surgeon: Eugenio Camara M.D.;  [place];  Service: Pain Management   • Pr inj dx/ther agnt paravert facet joint, reno*  10/6/2016     Procedure: INJ PARA FACET L/S 2D LVL W/IG;  Surgeon: Eugenio Camara M.D.;  [place];  Service: Pain Management   • Pr inj dx/ther agnt paravert facet joint, reno*  10/6/2016     Procedure: INJ PARA FACET L/S 3D LVL W/IG;  Surgeon: Eugenio FAN  LAUREL Camara;  Location: [facility];  Service: Pain Management     Social History     Social History   • Marital Status:      Spouse Name: N/A   • Number of Children: N/A   • Years of Education: N/A     Occupational History   • Not on file.     Social History Main Topics   • Smoking status: Never Smoker    • Smokeless tobacco: Never Used   • Alcohol Use: No   • Drug Use: No   • Sexual Activity: Not on file     Other Topics Concern   • Not on file     Social History Narrative         Patient Active Problem List    Diagnosis Date Noted   • Lumbar radiculopathy 10/06/2016   • Chronic lumbar radiculopathy 08/18/2016   • Chronic infection of sinus 05/17/2016   • Essential hypertension 05/17/2016   • Thyroid activity decreased 05/17/2016   • Hyperlipidemia 05/17/2016   • Impotence 05/17/2016   • Impaired renal function 05/17/2016   • Bilateral stenosis of lateral recess of lumbar spine 03/12/2016       Current Outpatient Prescriptions   Medication Sig Dispense Refill   • levothyroxine (SYNTHROID) 137 MCG Tab Take 1 Tab by mouth Every morning on an empty stomach. 90 Tab 3   • atorvastatin (LIPITOR) 10 MG Tab Take 1 Tab by mouth every day. 90 Tab 3   • hydrochlorothiazide (HYDRODIURIL) 12.5 MG tablet Take 1 Tab by mouth every day. 90 Tab 3   • tadalafil (CIALIS) 20 MG tablet Take 1 Tab by mouth as needed for Erectile Dysfunction. 10 Tab 11   • ibuprofen (MOTRIN) 200 MG Tab Take 200 mg by mouth every 6 hours as needed.     • aspirin 81 MG EC tablet Take  by mouth.     • multivitamin (THERAGRAN) Tab Take 1 Tab by mouth every day.     • Omega 3 1000 MG Cap Take  by mouth every day.     • b complex vitamins tablet Take 1 Tab by mouth every day.     • Calcium Carbonate-Vitamin D (CALCIUM + D PO) Take  by mouth every day.       No current facility-administered medications for this visit.       ROS: As per HPI. Additional pertinent symptoms as noted below.  Review of Systems   Constitutional: Negative for  "fever, chills, weight loss, malaise/fatigue and diaphoresis.   HENT: Positive for hearing loss. Negative for sore throat.    Eyes: Negative for blurred vision, double vision, photophobia and pain.   Respiratory: Negative for cough, shortness of breath and wheezing.    Cardiovascular: Negative for chest pain, palpitations and leg swelling.   Gastrointestinal: Negative for nausea, vomiting, diarrhea and constipation.   Genitourinary: Negative for dysuria, urgency, frequency and hematuria.   Musculoskeletal: Positive for back pain. Negative for myalgias and falls.   Skin: Negative for itching and rash.   Neurological: Negative for dizziness, focal weakness, weakness and headaches.   Psychiatric/Behavioral: Negative for depression. The patient is not nervous/anxious and does not have insomnia.    All other systems reviewed and are negative.      /92 mmHg  Pulse 72  Temp(Src) 36.4 °C (97.5 °F)  Ht 1.778 m (5' 10\")  Wt 86.138 kg (189 lb 14.4 oz)  BMI 27.25 kg/m2  SpO2 98%    Physical Exam   Constitutional: Well-nourished, well-developed, no acute distress  Eyes: Pupils are equal, round, and reactive to light. No scleral icterus.  Neck: Neck supple. No thyromegaly present.   Cardiovascular: Regular rate and rhythm, no murmur/gallops/rub  Pulmonary/Chest: Breath sounds normal. Chest wall is not dull to percussion.   Musculoskeletal: Range of motion in bilateral arms and legs within normal limits  Lymphadenopathy: no cervical or axillary adenopathy  Neurological: Cranial nerves II through XII grossly intact, no focal deficit noted.   Skin: Normal for age and race, no rash  Extremities: No clubbing/cyanosis/edema present  Abdomen: Soft, nontender, non-distended, bowel sounds normal ×4  Psych: Mood and affect are appropriate  Vital signs and nursing notes reviewed    Note: I have reviewed all pertinent labs and diagnostic tests associated with this visit with specific comments listed under the assessment and plan " below    Assessment and Plan    1. Bilateral stenosis of lateral recess of lumbar spine  2. Chronic lumbar radiculopathy  MRI discloses severe degenerative changes in lumbar spine with associated symptomatic radiculopathy. The patient has been seen by neurosurgery (morning) who has referred him to [facility] for surgical intervention. He has seen Dr. Person at [facility], surgical intervention is planned. Will follow-up for any medical need.      3. Essential hypertension  Blood pressure remains well controlled for last multiple visits in office. The patient has been on HCTZ 12.5 mg daily stable for several years. Diastolic blood pressures slightly elevated 92 today, however has been normal in past visits. We will continue to watch.  -Refill written: HCTZ 12.5 mg daily  -Lab: CMP to measure electrolytes    4. Acquired hypothyroidism  Stable on dose of levothyroxine 137 µg daily for past several years.  -Refill written: Thyroxine 137 µg daily  -Lab: TSH with reflex T4    5. Dyslipidemia  Lipid panel performed 9/12/2016 disclosed LDL 99, HDL 55. He has been taking atorvastatin 10 mg daily with good tolerance.  -Refill written: Atorvastatin 10 mg daily    6. Erectile dysfunction  Patient is gotten good response from Cialis 20 mg, requests refill. He is also had some difficulty with insurance coverage for his erectile dysfunction medication. Will fill prior authorization if requested.  -Refill written: Cialis 20 mg when necessary    7. Preventative health care  Preventive care  Flu -10/2016  TDaP -2/2015  Pneumococcal -10/2015  Prevnar -2/2015  [facility]-2/[facility]      8. Screening for prostate cancer  After discussion with patient regarding the most current literature vis-à-vis benefit/risk of PSA screening, the patient would prefer to be screened. He does not have lower urinary tract symptoms of weak stream, hesitancy, starting and stopping, nocturia.  -Lab:  PSA      Followup: Return in about 1 year (around 8/25/2018) for With Dr Elzbieta Lopez.      Signed by: Rodger Lau D.O.

## 2022-06-09 NOTE — TELEPHONE ENCOUNTER
Pt returned office call to schedule from referral. Pt is requesting office send new pt paperwork. Address has been verified.     Thank you

## 2022-07-14 ENCOUNTER — OFFICE VISIT (OUTPATIENT)
Dept: OTOLARYNGOLOGY | Facility: CLINIC | Age: 57
End: 2022-07-14

## 2022-07-14 ENCOUNTER — OFFICE VISIT (OUTPATIENT)
Dept: NEUROLOGY | Age: 57
End: 2022-07-14
Payer: COMMERCIAL

## 2022-07-14 VITALS
HEIGHT: 66 IN | WEIGHT: 230 LBS | HEART RATE: 69 BPM | DIASTOLIC BLOOD PRESSURE: 73 MMHG | SYSTOLIC BLOOD PRESSURE: 115 MMHG | RESPIRATION RATE: 22 BRPM | BODY MASS INDEX: 36.96 KG/M2

## 2022-07-14 VITALS
DIASTOLIC BLOOD PRESSURE: 75 MMHG | SYSTOLIC BLOOD PRESSURE: 136 MMHG | WEIGHT: 238 LBS | BODY MASS INDEX: 37.35 KG/M2 | HEIGHT: 67 IN | TEMPERATURE: 97 F

## 2022-07-14 DIAGNOSIS — H69.83 DYSFUNCTION OF BOTH EUSTACHIAN TUBES: Primary | ICD-10-CM

## 2022-07-14 DIAGNOSIS — H61.22 IMPACTED CERUMEN OF LEFT EAR: ICD-10-CM

## 2022-07-14 DIAGNOSIS — M79.604 LEG PAIN, BILATERAL: Primary | ICD-10-CM

## 2022-07-14 DIAGNOSIS — G89.29 CHRONIC BILATERAL LOW BACK PAIN WITH BILATERAL SCIATICA: ICD-10-CM

## 2022-07-14 DIAGNOSIS — M54.41 CHRONIC BILATERAL LOW BACK PAIN WITH BILATERAL SCIATICA: ICD-10-CM

## 2022-07-14 DIAGNOSIS — M79.605 LEG PAIN, BILATERAL: Primary | ICD-10-CM

## 2022-07-14 DIAGNOSIS — M54.42 CHRONIC BILATERAL LOW BACK PAIN WITH BILATERAL SCIATICA: ICD-10-CM

## 2022-07-14 DIAGNOSIS — G62.9 NEUROPATHY: ICD-10-CM

## 2022-07-14 DIAGNOSIS — Z86.59 HISTORY OF ANXIETY: ICD-10-CM

## 2022-07-14 DIAGNOSIS — H72.92 PERFORATION OF LEFT TYMPANIC MEMBRANE: ICD-10-CM

## 2022-07-14 PROCEDURE — 99213 OFFICE O/P EST LOW 20 MIN: CPT | Performed by: OTOLARYNGOLOGY

## 2022-07-14 PROCEDURE — 69210 REMOVE IMPACTED EAR WAX UNI: CPT | Performed by: OTOLARYNGOLOGY

## 2022-07-14 PROCEDURE — 99204 OFFICE O/P NEW MOD 45 MIN: CPT | Performed by: PSYCHIATRY & NEUROLOGY

## 2022-07-14 RX ORDER — PREDNISONE 10 MG/1
TABLET ORAL
COMMUNITY
Start: 2022-07-05

## 2022-07-14 RX ORDER — HYDROCHLOROTHIAZIDE 12.5 MG/1
TABLET ORAL
COMMUNITY

## 2022-07-14 RX ORDER — OXYCODONE HYDROCHLORIDE 10 MG/1
10 TABLET ORAL EVERY 6 HOURS PRN
COMMUNITY
Start: 2022-05-26

## 2022-07-14 RX ORDER — PREDNISONE 10 MG/1
1 TABLET ORAL EVERY MORNING
COMMUNITY
Start: 2022-07-05 | End: 2022-07-14 | Stop reason: SDUPTHER

## 2022-07-14 RX ORDER — TIZANIDINE 4 MG/1
TABLET ORAL
COMMUNITY

## 2022-07-14 RX ORDER — TRAZODONE HYDROCHLORIDE 150 MG/1
TABLET ORAL
COMMUNITY
Start: 2022-07-07

## 2022-07-14 RX ORDER — ESTRADIOL 1 MG/1
1 TABLET ORAL DAILY
COMMUNITY

## 2022-07-14 RX ORDER — TRAZODONE HYDROCHLORIDE 150 MG/1
150 TABLET ORAL EVERY EVENING
COMMUNITY
Start: 2022-05-26

## 2022-07-14 RX ORDER — PROMETHAZINE HYDROCHLORIDE 25 MG/1
TABLET ORAL
COMMUNITY
Start: 2022-04-07

## 2022-07-14 RX ORDER — BUSPIRONE HYDROCHLORIDE 10 MG/1
TABLET ORAL
COMMUNITY
Start: 2022-07-06

## 2022-07-14 RX ORDER — ALBUTEROL SULFATE 90 UG/1
1 AEROSOL, METERED RESPIRATORY (INHALATION) EVERY 6 HOURS PRN
COMMUNITY
Start: 2021-11-03

## 2022-07-14 RX ORDER — MECLIZINE HYDROCHLORIDE 25 MG/1
TABLET ORAL
COMMUNITY

## 2022-07-14 RX ORDER — GABAPENTIN 300 MG/1
CAPSULE ORAL
COMMUNITY
Start: 2022-07-05

## 2022-07-14 RX ORDER — BUSPIRONE HYDROCHLORIDE 10 MG/1
TABLET ORAL 3 TIMES DAILY
COMMUNITY
Start: 2022-07-06

## 2022-07-14 RX ORDER — ESTRADIOL 1 MG/1
TABLET ORAL
COMMUNITY
Start: 2022-07-07

## 2022-07-14 RX ORDER — POTASSIUM CHLORIDE 20 MEQ/1
TABLET, EXTENDED RELEASE ORAL
COMMUNITY

## 2022-07-14 RX ORDER — HYDROCHLOROTHIAZIDE 12.5 MG/1
CAPSULE, GELATIN COATED ORAL
COMMUNITY
Start: 2022-07-07

## 2022-07-14 RX ORDER — DIPHENOXYLATE HYDROCHLORIDE AND ATROPINE SULFATE 2.5; .025 MG/1; MG/1
TABLET ORAL
COMMUNITY

## 2022-07-14 RX ORDER — ALBUTEROL SULFATE 0.63 MG/3ML
SOLUTION RESPIRATORY (INHALATION)
COMMUNITY

## 2022-07-14 RX ORDER — MONTELUKAST SODIUM 10 MG/1
10 TABLET ORAL NIGHTLY
COMMUNITY

## 2022-07-14 RX ORDER — FLUTICASONE PROPIONATE AND SALMETEROL 100; 50 UG/1; UG/1
1 POWDER RESPIRATORY (INHALATION) EVERY 12 HOURS
COMMUNITY

## 2022-07-14 RX ORDER — BACLOFEN 20 MG/1
20 TABLET ORAL
COMMUNITY

## 2022-07-14 RX ORDER — OMEPRAZOLE 40 MG/1
CAPSULE, DELAYED RELEASE ORAL
COMMUNITY
Start: 2022-07-12

## 2022-07-14 RX ORDER — BISOPROLOL FUMARATE 5 MG/1
5 TABLET ORAL DAILY
COMMUNITY
Start: 2022-05-09

## 2022-07-14 RX ORDER — AZELASTINE HYDROCHLORIDE 137 UG/1
SPRAY, METERED NASAL
COMMUNITY
Start: 2022-07-06

## 2022-07-14 RX ORDER — GABAPENTIN 300 MG/1
CAPSULE ORAL 3 TIMES DAILY
COMMUNITY
Start: 2022-07-05

## 2022-07-14 RX ORDER — FLUTICASONE PROPIONATE 50 MCG
SPRAY, SUSPENSION (ML) NASAL
COMMUNITY

## 2022-07-14 RX ORDER — PREDNISONE 10 MG/1
TABLET ORAL
Qty: 15 TABLET | Refills: 0 | Status: SHIPPED | OUTPATIENT
Start: 2022-07-14 | End: 2022-07-20

## 2022-07-14 RX ORDER — UBROGEPANT 100 MG/1
TABLET ORAL
COMMUNITY

## 2022-07-14 NOTE — PROGRESS NOTES
Benedict Gupta MD  [facility] EAR NOSE & THROAT  [address], [address]  [place] [address]  Fax [phone]  Phone [phone]      Visit Type: FOLLOW UP   Chief Complaint   Patient presents with   • Dysfunction of both eustachian tubes     4 month follow up         DESTINI Stewart is a  57 y.o. female who is here for follow up.      Past Medical History:   Diagnosis Date   • Allergic rhinitis    • Arthritis    • Chronic rhinitis    • Chronic sinusitis    • Eustachian tube dysfunction    • GERD (gastroesophageal reflux disease)    • Tobacco use disorder    • Vertigo        Past Surgical History:   Procedure Laterality Date   • APPENDECTOMY     • BREAST BIOPSY     •  SECTION     • CHOLECYSTECTOMY     • HYSTERECTOMY     • MYRINGOTOMY W/ TUBES Bilateral 2015   • TYMPANOPLASTY Left        Family History: Her family history includes Cancer in her father; Diabetes in her mother.     Social History: She  reports that she has quit smoking. She has never used smokeless tobacco. She reports that she does not drink alcohol and does not use drugs.    Home Medications:  Fluticasone-Salmeterol, Tiotropium Bromide Monohydrate, aMILoride, albuterol, amitriptyline, aspirin, atorvastatin, baclofen, busPIRone, cetirizine, cholecalciferol, enalapril, estradiol, fluticasone, gabapentin, hydroCHLOROthiazide, montelukast, omeprazole, potassium chloride, predniSONE, promethazine, tiZANidine, traZODone, and zolpidem    Allergies:  She is allergic to acetaminophen, ciprofloxacin, hydromorphone, ibuprofen, naproxen, nsaids, penicillins, tramadol, and bromfenac.       Vital Signs:   Temp:  [97 °F (36.1 °C)] 97 °F (36.1 °C)  BP: (136)/(75) 136/75  ENT Physical Exam  Constitutional  Appearance: patient appears well-developed and well-nourished,  Communication/Voice: communication appropriate for developmental age; vocal quality normal;  Head and  Face  Appearance: head appears normal, face appears normal and face appears atraumatic;  Palpation: facial palpation normal;  Salivary: glands normal;  Ear  Hearing: intact;  Auricles: right auricle normal; left auricle normal;  External Mastoids: right external mastoid normal; left external mastoid normal;  Nose  External Nose: nares patent bilaterally; external nose normal;  Oral Cavity/Oropharynx  Lips: normal;  Neck  Neck: neck normal;  Respiratory  Inspection: breathing unlabored; normal breathing rate;  Cardiovascular  Inspection: extremities are warm and well perfused; no peripheral edema present;  Neurovestibular  Mental Status: alert and oriented;  Psychiatric: mood normal; affect is appropriate;     Ear Microscopy with Left Cerumen Removal    Date/Time: 7/14/2022 2:00 PM  Performed by: Benedict Gupta MD  Authorized by: Benedict Gupta MD     Ear examination was performed utilizing binocular microscopy.  Right tympanic membrane:   right PE tube present: normal.   Left ear canal:   impacted cerumen.   Left tympanic membrane:   perforation present (10%). perforation details: posterior     Procedure:    Cerumen was removed from the left ear with a forceps.   Post-procedure details:     The procedure was tolerated well, no immediate complications.       Result Review    RESULTS REVIEW    I have reviewed the patients old records in the chart.     Assessment & Plan    Diagnoses and all orders for this visit:    1. Dysfunction of both eustachian tubes (Primary)    2. Perforation of left tympanic membrane    Other orders  -     predniSONE (DELTASONE) 10 MG tablet; Take 3 tablets by mouth Every Morning for 3 days, THEN 2 tablets Every Morning for 3 days.  Dispense: 15 tablet; Refill: 0  -     $ Binocular Microscopy                  Return in about 3 months (around 10/14/2022) for follow up with Pricilla MANCIA.      Benedict Gupta MD  07/14/22  14:03 CDT

## 2022-07-14 NOTE — PROGRESS NOTES
REVIEW OF SYSTEMS    Constitutional: []Fever []Sweats []Chills [] Recent Injury   [x] Denies all unless marked  HENT:[]Headache  [] Head Injury  [] Sore Throat  [] Ear Pain  [] Dizziness [] Hearing Loss   [x] Denies all unless marked  Musculoskeletal: [] Arthralgia  [] Myalgias [] Muscle cramps  [] Muscle twitches   [x] Denies all unless marked   Spine:  [] Neck pain  [x] Back pain  [] Sciaticia  [] Denies all unless marked  Neurological:[] Visual Disturbance [] Double Vision [] Slurred Speech [] Trouble swallowing  [] Vertigo [x] Tingling [x] Numbness [x] Weakness [x] Loss of Balance   [] Loss of Consciousness [] Memory Loss [] Seizures  [] Denies all unless marked  Psychiatric/Behavioral:[] Depression [] Anxiety  [x] Denies all unless marked  Sleep: []  Insomnia [] Sleep Disturbance [] Snoring [] Restless Legs [] Daytime Sleepiness [] Sleep Apnea  [x] Denies all unless marked

## 2022-07-14 NOTE — PROGRESS NOTES
Chief Complaint   Patient presents with    New Patient     Bilat leg and foot pain, sees ortho group  Monday       Magnus Medrano is a 65y.o. year old female who is seen for evaluation of possible neuropathy. He  Goes to pain management for chronic low back pain with bilateral sciatica going down into the toes. Also complains of some intermittent numbness and tingling in the toes in general.  She has had multiple orthopedic injuries to the lower extremities including the left Nova-T arthroplasty. She takes Oxy Cotolone 3 times a day chronically and more recently gabapentin 300 mg 3 times a day was added with questionable improvement. Does have some swelling in her feet. Old records are reviewed in detail. Also has some chronic underlying anxiety as well. Active Ambulatory Problems     Diagnosis Date Noted    No Active Ambulatory Problems     Resolved Ambulatory Problems     Diagnosis Date Noted    No Resolved Ambulatory Problems     No Additional Past Medical History       Past Surgical History:   Procedure Laterality Date    ANKLE SURGERY Right     CERVICAL SPINE SURGERY       SECTION      CHOLECYSTECTOMY      HAND SURGERY      TOTAL KNEE ARTHROPLASTY Left     TOTAL VAGINAL HYSTERECTOMY         History reviewed. No pertinent family history.     Allergies   Allergen Reactions    Ciprofloxacin     Hydromorphone     Nsaids     Pcn [Penicillins]     Tramadol     Tylenol [Acetaminophen]        Social History     Socioeconomic History    Marital status:      Spouse name: Not on file    Number of children: Not on file    Years of education: Not on file    Highest education level: Not on file   Occupational History    Not on file   Tobacco Use    Smoking status: Former Smoker     Quit date: 2018     Years since quittin.5    Smokeless tobacco: Never Used   Substance and Sexual Activity    Alcohol use: Never    Drug use: Never    Sexual activity: Not on file   Other Topics [x]? Loss of Balance   []? Loss of Consciousness []? Memory Loss []? Seizures  []? Denies all unless marked  Psychiatric/Behavioral:[]? Depression []? Anxiety  [x]? Denies all unless marked  Sleep: []? Insomnia []? Sleep Disturbance []? Snoring []? Restless Legs []? Daytime Sleepiness []? Sleep Apnea  [x]? Denies all unless marked      Current Outpatient Medications   Medication Sig Dispense Refill    ASPIRIN 81 PO Take 81 mg by mouth daily      B Complex Vitamins (VITAMIN B COMPLEX 100 IJ) Take 1 tablet by mouth daily      albuterol sulfate HFA (PROVENTIL;VENTOLIN;PROAIR) 108 (90 Base) MCG/ACT inhaler Inhale 1 puff into the lungs every 6 hours as needed      albuterol (ACCUNEB) 0.63 MG/3ML nebulizer solution albuterol sulfate 0.63 mg/3 mL solution for nebulization      Azelastine HCl 137 MCG/SPRAY SOLN       baclofen (LIORESAL) 20 MG tablet Take 20 mg by mouth      bisoprolol (ZEBETA) 5 MG tablet Take 5 mg by mouth daily      busPIRone (BUSPAR) 10 MG tablet 3 times daily       vitamin D (CHOLECALCIFEROL) 54352 UNIT CAPS Take 50,000 Units by mouth once a week      diphenoxylate-atropine (LOMOTIL) 2.5-0.025 MG per tablet diphenoxylate-atropine 2.5 mg-0.025 mg tablet      estradiol (ESTRACE) 1 MG tablet Take 1 mg by mouth daily      fluticasone (FLONASE) 50 MCG/ACT nasal spray fluticasone propionate 50 mcg/actuation nasal spray,suspension      gabapentin (NEURONTIN) 300 MG capsule 3 times daily.  hydroCHLOROthiazide (HYDRODIURIL) 12.5 MG tablet hydrochlorothiazide 12.5 mg tablet      meclizine (ANTIVERT) 25 MG tablet meclizine 25 mg tablet      montelukast (SINGULAIR) 10 MG tablet Take 10 mg by mouth nightly      oxyCODONE HCl (OXY-IR) 10 MG immediate release tablet Take 10 mg by mouth every 6 hours as needed.        predniSONE (DELTASONE) 10 MG tablet TAKE 1 TABLET BY MOUTH EVERY MORNING      promethazine (PHENERGAN) 25 MG tablet       traZODone (DESYREL) 150 MG tablet Take 150 mg by mouth every evening      Ubrogepant (UBRELVY) 100 MG TABS Ubrelvy 100 mg tablet       No current facility-administered medications for this visit. Outpatient Medications Marked as Taking for the 7/14/22 encounter (Office Visit) with Ko Ayala MD   Medication Sig Dispense Refill    ASPIRIN 81 PO Take 81 mg by mouth daily      B Complex Vitamins (VITAMIN B COMPLEX 100 IJ) Take 1 tablet by mouth daily      albuterol sulfate HFA (PROVENTIL;VENTOLIN;PROAIR) 108 (90 Base) MCG/ACT inhaler Inhale 1 puff into the lungs every 6 hours as needed      albuterol (ACCUNEB) 0.63 MG/3ML nebulizer solution albuterol sulfate 0.63 mg/3 mL solution for nebulization      Azelastine HCl 137 MCG/SPRAY SOLN       baclofen (LIORESAL) 20 MG tablet Take 20 mg by mouth      bisoprolol (ZEBETA) 5 MG tablet Take 5 mg by mouth daily      busPIRone (BUSPAR) 10 MG tablet 3 times daily       vitamin D (CHOLECALCIFEROL) 28553 UNIT CAPS Take 50,000 Units by mouth once a week      diphenoxylate-atropine (LOMOTIL) 2.5-0.025 MG per tablet diphenoxylate-atropine 2.5 mg-0.025 mg tablet      estradiol (ESTRACE) 1 MG tablet Take 1 mg by mouth daily      fluticasone (FLONASE) 50 MCG/ACT nasal spray fluticasone propionate 50 mcg/actuation nasal spray,suspension      gabapentin (NEURONTIN) 300 MG capsule 3 times daily.  hydroCHLOROthiazide (HYDRODIURIL) 12.5 MG tablet hydrochlorothiazide 12.5 mg tablet      meclizine (ANTIVERT) 25 MG tablet meclizine 25 mg tablet      montelukast (SINGULAIR) 10 MG tablet Take 10 mg by mouth nightly      oxyCODONE HCl (OXY-IR) 10 MG immediate release tablet Take 10 mg by mouth every 6 hours as needed.        predniSONE (DELTASONE) 10 MG tablet TAKE 1 TABLET BY MOUTH EVERY MORNING      promethazine (PHENERGAN) 25 MG tablet       traZODone (DESYREL) 150 MG tablet Take 150 mg by mouth every evening      Ubrogepant (UBRELVY) 100 MG TABS Ubrelvy 100 mg tablet         /73   Pulse 69   Resp 22   Ht 5' 6\" (1.676 m)   Wt 230 lb (104.3 kg)   BMI 37.12 kg/m²       Constitutional - well developed, well nourished. Eyes - conjunctiva normal.  Pupils react to light  Ear, nose, throat -hearing intact to finger rub No scars, masses, or lesions over external nose or ears, no atrophy of tongue  Neck-symmetric, no masses noted, no jugular vein distension. No bruits noted. Respiration- chest wall appears symmetric, good expansion,   normal effort without use of accessory muscles  Cardiovascular- RRR  Musculoskeletal - no significant wasting of muscles noted, no bony deformities, gait no gross ataxia  Extremities-no clubbing, cyanosis or edema  Skin - warm, dry, and intact. No rash, erythema, or pallor. Psychiatric - mood, affect, and behavior appear normal.      Neurological exam  Awake, alert, fluent oriented x 3 appropriate affect  Attention and concentration appear appropriate  Recent and remote memory appears unremarkable  Speech normal without dysarthria  No clear issues with language of fund of knowledge    Cranial Nerve Exam   CN II- Visual fields grossly unremarkable. VA adequate. Discs sharp  CN III, IV,VI- PERRLA, EOMI, No nystagmus, conjugate eye movements, no ptosis  CN V-sensation intact to LT over face  CN VII-no facial asymmetry  CN VIII-Hearing intact   CN IX and X- Palate elevates in midline  CN XI-good shoulder shrug  CN XII-Tongue midline with no fasciculations or fibrillations    Motor Exam  V/V throughout upper and lower extremities bilaterally, no cogwheeling, normal tone    Sensory Exam  Normal vibration sense. Decreased pinprick to the right calf. Uncertain on the left    Reflexes   1+ in the arms and 2-3+ in the legs  No clonus ankles  No Charles's sign bilateral hands. No Babinski sign.     Tremors- no tremors in hands or head noted    Gait  Antalgic appearing    Coordination  Finger to nose and ARSEN-unremarkable        Assessment    ICD-10-CM    1. Leg pain, bilateral  M79.604 Nerve Conduction Test with EMG    M79.605    2. History of anxiety  Z86.59    3. Chronic bilateral low back pain with bilateral sciatica  M54.42     M54.41     G89.29    4. Neuropathy  G62.9 Nerve Conduction Test with EMG     Possible neuropathy. Follow-up for EMG of the bilateral lower extremities. Weight loss would probably help with some of her difficulties as well. Defer back difficulties to pain management. Plan    Return in about 4 weeks (around 8/11/2022).     (Please note that portions of this note were completed with a voice recognition program. Efforts were made to edit the dictations but occasionally words are mis-transcribed.)

## 2022-10-12 ENCOUNTER — OFFICE VISIT (OUTPATIENT)
Dept: OTOLARYNGOLOGY | Facility: CLINIC | Age: 57
End: 2022-10-12

## 2022-10-12 VITALS — BODY MASS INDEX: 39.86 KG/M2 | HEIGHT: 66 IN | WEIGHT: 248 LBS

## 2022-10-12 DIAGNOSIS — H69.83 DYSFUNCTION OF BOTH EUSTACHIAN TUBES: Primary | ICD-10-CM

## 2022-10-12 DIAGNOSIS — H92.11 OTORRHEA OF RIGHT EAR: ICD-10-CM

## 2022-10-12 DIAGNOSIS — H93.93 DISORDER OF BOTH EARS: ICD-10-CM

## 2022-10-12 DIAGNOSIS — Z96.22 S/P MYRINGOTOMY WITH INSERTION OF TUBE: ICD-10-CM

## 2022-10-12 DIAGNOSIS — H72.92 PERFORATION OF LEFT TYMPANIC MEMBRANE: ICD-10-CM

## 2022-10-12 PROCEDURE — 99214 OFFICE O/P EST MOD 30 MIN: CPT | Performed by: EMERGENCY MEDICINE

## 2022-10-12 RX ORDER — ALPRAZOLAM 2 MG/1
TABLET ORAL
COMMUNITY
Start: 2022-09-14

## 2022-10-12 RX ORDER — ALLOPURINOL 300 MG/1
TABLET ORAL
COMMUNITY
Start: 2022-09-20

## 2022-10-12 RX ORDER — NEOMYCIN SULFATE, POLYMYXIN B SULFATE AND HYDROCORTISONE 10; 3.5; 1 MG/ML; MG/ML; [USP'U]/ML
3 SUSPENSION/ DROPS AURICULAR (OTIC) 3 TIMES DAILY
Qty: 10 ML | Refills: 0 | Status: SHIPPED | OUTPATIENT
Start: 2022-10-12 | End: 2022-10-19

## 2022-10-12 NOTE — PROGRESS NOTES
BLANCHE Stinson [facility] EAR NOSE [facility] THRO[facility]  [address], [address]  [facility] [address]  Fax [phone]  Phone [phone]      Visit Type: FOLLOW UP   Chief Complaint   Patient presents with   • Ear Problem     Left ear draining        DESTINI Stewart is a 57 y.o. female who presents status post myringotomy tube insertion. The patient has had: otorrhea. bilateral.    Past Medical History:   Diagnosis Date   • Allergic rhinitis    • Arthritis    • Chronic rhinitis    • Chronic sinusitis    • Eustachian tube dysfunction    • GERD (gastroesophageal reflux disease)    • Tobacco use disorder    • Vertigo        Past Surgical History:   Procedure Laterality Date   • APPENDECTOMY     • BREAST BIOPSY     •  SECTION     • CHOLECYSTECTOMY     • HYSTERECTOMY     • MYRINGOTOMY W/ TUBES Bilateral 2015   • TYMPANOPLASTY Left        Family History: Her family history includes Cancer in her father; Diabetes in her mother.     Social History: She  reports that she has quit smoking. She has never used smokeless tobacco. She reports that she does not drink alcohol and does not use drugs.    Home Medications:  ALPRAZolam, Aspirin Buf(CaCarb-MgCarb-MgO), Fluticasone-Salmeterol, Tiotropium Bromide Monohydrate, aMILoride, albuterol, allopurinol, amitriptyline, aspirin, atorvastatin, baclofen, busPIRone, cetirizine, cholecalciferol, enalapril, estradiol, fluticasone, gabapentin, hydroCHLOROthiazide, montelukast, neomycin-polymyxin-hydrocortisone, omeprazole, potassium chloride, promethazine, tiZANidine, traZODone, and zolpidem    Allergies:  She is allergic to ciprofloxacin, ibuprofen, naproxen, nsaids, penicillins, tramadol, and bromfenac.       Vital Signs:      ENT Physical Exam  Ear  Hearing: impaired to conversational voice (bilateral hearing aid used);  Auricles: right auricle normal; left auricle normal;  External Mastoids: right external  mastoid normal; left external mastoid normal;  Ear Canals: right ear canal drainage observed;  Tympanic Membranes: right tympanic membrane tympanostomy tube noted; tympanostomy tube with otorrhea; left tympanic membrane perforated;perforation posterior; perforation size: 5%;  Ear comments: Right ear with clear otorrhea, left ear is dry         Result Review    RESULTS REVIEW    I have reviewed the patients old records in the chart.     Assessment & Plan    Diagnoses and all orders for this visit:    1. Dysfunction of both eustachian tubes (Primary)    2. S/P myringotomy with insertion of tube    3. Perforation of left tympanic membrane    4. Otorrhea of right ear    Other orders  -     neomycin-polymyxin-hydrocortisone (CORTISPORIN) [id number] otic suspension; Administer 3 drops into ear(s) as directed by provider 3 (Three) Times a Day for 7 days.  Dispense: 10 mL; Refill: 0            Protect getting water in the ears. If needed, may use over the counter silicone plugs or a cotton ball coated with vasoline when bathing.  Use hairdryer on a cool setting after bathing.  For proper use of ear drops, push on tragus (cartilage in front of ear canal) after drop placement.      Return in about 3 months (around 1/12/2023) for Follow up with me when Dr. Gupta in clinic.      BLANCHE Stinson  10/12/22  13:37 CDT